# Patient Record
Sex: MALE | Race: WHITE | NOT HISPANIC OR LATINO | Employment: FULL TIME | URBAN - METROPOLITAN AREA
[De-identification: names, ages, dates, MRNs, and addresses within clinical notes are randomized per-mention and may not be internally consistent; named-entity substitution may affect disease eponyms.]

---

## 2020-04-20 ENCOUNTER — TELEPHONE (OUTPATIENT)
Dept: ORTHOPEDICS | Facility: CLINIC | Age: 60
End: 2020-04-20

## 2020-04-20 RX ORDER — DOLUTEGRAVIR SODIUM AND LAMIVUDINE 50; 300 MG/1; MG/1
1 TABLET, FILM COATED ORAL NIGHTLY
COMMUNITY
Start: 2020-04-06

## 2020-06-08 ENCOUNTER — TELEPHONE (OUTPATIENT)
Dept: ORTHOPEDICS | Facility: CLINIC | Age: 60
End: 2020-06-08

## 2020-06-08 NOTE — TELEPHONE ENCOUNTER
I spoke with he stated he already had his blood work done and he is good to go. Pt. Has no furthered questions.        ----- Message from Joanna Alexander sent at 6/8/2020 11:39 AM CDT -----  Contact: self [phone]  Patient is calling with some questions about what he spoke with you about earlier today. Please call

## 2020-06-08 NOTE — TELEPHONE ENCOUNTER
Spoke with patient, informed that orders for labs have been faxed to his PCP (Dr. Darvin Reed), once results received, his chart can be submitted for review.  Patient verbalized understanding.

## 2020-06-09 ENCOUNTER — TELEPHONE (OUTPATIENT)
Dept: ORTHOPEDICS | Facility: CLINIC | Age: 60
End: 2020-06-09

## 2020-06-09 NOTE — TELEPHONE ENCOUNTER
Spoke with patient, informed that Mya, from Dr. Darvin Reed office called and stated that only the CBC was drawn, informed patient that he needed to return to the office and have the CMP and PT drawn as well.  Patient stated that he would return to the office ASAP.

## 2020-06-09 NOTE — TELEPHONE ENCOUNTER
Spoke with Mya, she informed that she only kwame the CBC on the patient as he was in the office on yesterday. Writer informed that the patient would be contacted so that he could return for the additional labs.

## 2020-06-18 ENCOUNTER — TELEPHONE (OUTPATIENT)
Dept: ORTHOPEDICS | Facility: CLINIC | Age: 60
End: 2020-06-18

## 2020-06-18 NOTE — TELEPHONE ENCOUNTER
Spoke with patient, inquired if he sees a Nephrologist / Urologist due to this PCP notes indicating that he has a disorder of the kidney and ureter.  Patient stated that he know nothing about this diagnosis and was told that everything was fine at his last MD appointment.

## 2020-06-26 ENCOUNTER — TELEPHONE (OUTPATIENT)
Dept: ORTHOPEDICS | Facility: CLINIC | Age: 60
End: 2020-06-26

## 2020-06-26 NOTE — TELEPHONE ENCOUNTER
Spoke with patient, informed him of Dr. Bui concerns, he stated that he drinks  lots of water, he lifts weights every other day, he states that his physician asks him, every time he goes in, if he is staying hydrated.    Writer also informed that Dr. Reis requested medical clearance from his doctor, the request has been forwarded and currently waiting for a response.  Writer will contact him when a response is received from Dr. Reis.  Patient verbalized understanding.

## 2020-06-29 ENCOUNTER — TELEPHONE (OUTPATIENT)
Dept: ORTHOPEDICS | Facility: CLINIC | Age: 60
End: 2020-06-29

## 2020-06-29 NOTE — TELEPHONE ENCOUNTER
Spoke with patient, informed that Dr. Reed is out of town until July 9, 2020, therefore the letter of clearance will not arrive soon.  He stated that he has seen the NP in the office a couple of times and will call to see if she would feel comfortable writing the letter of clearance.  Writer informed that the reviewing physician was concerned re: elevated creatinine & BUN, as well as a mildly low saturation, questioning lung issues.  The patient stated that his labs may be elevated due to the workout supplements that he takes. He asked if he should stop taking them, writer referred him to his physician.  Patient verbalized understanding.

## 2020-06-29 NOTE — TELEPHONE ENCOUNTER
Spoke with Dr. Derek Gee office, she stated that Dr. Reed is on vacation until July 9, 2020.  Writer forwarded a requested for a letter of optimization / clearance last week.  The letter will be sent upon the physicians' return.

## 2020-07-06 ENCOUNTER — TELEPHONE (OUTPATIENT)
Dept: ORTHOPEDICS | Facility: CLINIC | Age: 60
End: 2020-07-06

## 2020-07-06 DIAGNOSIS — M17.11 PRIMARY OSTEOARTHRITIS OF RIGHT KNEE: Primary | ICD-10-CM

## 2020-07-06 NOTE — TELEPHONE ENCOUNTER
Spoke with patient, in regards to Dr. Reis's concern: patient was asked: Are you still taking workout supplements? He stated that the protein powder he uses 2-3 daily has 4.5 mg of creatinine  per serving, he will trade it out for another supplement.    Spoke with patient, informed of surgery date of 8-13-20, patient accepted and [place] [facility] [place] informed that COVID-19 testing will occur on 8-12-20 informed of items NOT included in the bundle: DME, Meds, Post op PT, link to the portal sent to cell phone, encouraged to review education on portal, Telemed visit set up for 7-27-20 @  1600, requested that the name and contact information for a physical therapy facility for post op therapy be provided, patient stated she would research and provide at a later time. PCP agreement received 4-28-20 and Dr. Reed is willing to f/u post-operatively.  Patient verbalized understanding.

## 2020-07-14 DIAGNOSIS — M17.11 PRIMARY OSTEOARTHRITIS OF RIGHT KNEE: Primary | ICD-10-CM

## 2020-07-14 DIAGNOSIS — Z01.818 PRE-OP TESTING: Primary | ICD-10-CM

## 2020-07-28 ENCOUNTER — TELEPHONE (OUTPATIENT)
Dept: ORTHOPEDICS | Facility: CLINIC | Age: 60
End: 2020-07-28

## 2020-07-28 NOTE — TELEPHONE ENCOUNTER
Spoke to pt, apologized for not being there for the telemed visit yesterday. The appt was not transferred to my calendar. Pt states that is fine, no problem. telemed visit rescheduled for 8/6 at 4p. Pt will provide OP PT location at the visit. Pt pleased and verbalized understanding.

## 2020-07-30 ENCOUNTER — OFFICE VISIT (OUTPATIENT)
Dept: INTERNAL MEDICINE | Facility: CLINIC | Age: 60
End: 2020-07-30
Payer: COMMERCIAL

## 2020-07-30 DIAGNOSIS — B20 HIV INFECTION, UNSPECIFIED SYMPTOM STATUS: ICD-10-CM

## 2020-07-30 DIAGNOSIS — Z97.2 WEARS DENTURES: ICD-10-CM

## 2020-07-30 DIAGNOSIS — M17.11 OSTEOARTHRITIS OF RIGHT KNEE, UNSPECIFIED OSTEOARTHRITIS TYPE: ICD-10-CM

## 2020-07-30 PROCEDURE — 99499 UNLISTED E&M SERVICE: CPT | Mod: 95,COE,, | Performed by: HOSPITALIST

## 2020-07-30 PROCEDURE — 99499 NO LOS: ICD-10-PCS | Mod: 95,COE,, | Performed by: HOSPITALIST

## 2020-07-30 RX ORDER — CHOLECALCIFEROL (VITAMIN D3) 25 MCG
3000 TABLET ORAL DAILY
COMMUNITY

## 2020-07-30 NOTE — PROGRESS NOTES
Virtual pre op evaluation   Audio only   [place]- [place]  Consent obtained for virtual evaluation   Had trouble with video    Each patient to whom he or she provides medical services by telemedicine is:  (1) informed of the relationship between the physician and patient and the respective role of any other health care provider with respect to management of the patient; and (2) notified that he or she may decline to receive medical services by telemedicine and may withdraw from such care at any time.        Future cases for Carlos Vee [[id number]]     Case ID Status Date Time Amos Procedure Provider Location    [id number] [facility] 8/13/2020  7:00  ARTHROPLASTY, KNEE / [facility] RACHEL Beatty MD [[id number]] [facility] OR      Rt     Spoke to him  on the Phone    [facility]   Works as a   - work involves walking , lifting   5 days a week ( 40 hours a week ) - 6 A- 3 P  Walks about 6 miles a day    Has occasional Rt knee pain- getting more frequent - few times a day  Feels like the Rt knee may give out and during that time feel severe pain- momentary pain- past 1 year     No previous Rt knee surgeries  About 8 years ago , he hyperextended his Rt  Knee- was exercising ( Stretching ) - Pain started 1-2  year later   No  infections  No fracture history     Stays active  Works out 3 times a week   Lifts weights   Unable to do cardio     Lives in a Duplex house  One level house   Nice neighborhood , very close to work  Lives with a room mate , who can help him    Health conditions of significance for the perioperative period     HIV    Not known to have heart disease , Diabetes Mellitus,HTN,  Lung disease          Medication and allergies reviewed        Active cardiac conditions- None -  RCRI-None -  Stays active   MET's-   > 4 METs     ROS     No fever , chill   Weight stable   STOPBANG-  age , Neck size, age  BMI 24.5  No sudden vision changes -  No  cough, phlegm-  Bowels regular -  No Overt GI/ bleeding -  No steroid treatment recently -  No Dysuria, Hesitancy-  No rashes -  No syncope , unilateral weakness -  Not on ASA  No easy bruising , bleeding   Depression, Anxiety- None    PMH     No DVT,PE, Stenting      Surgical history      No anesthesia , bleeding , cardiac trouble , PONV with previous surgeries, procedures      Social      As noted     FH    FH- No anesthesia,bleeding / venous thrombosis , early onset heart disease in family        Healthy person        Dr DILCIA Reis MD MRCP ( ),FACP   [facility]  [facility]   Cell [phone]

## 2020-07-30 NOTE — ASSESSMENT & PLAN NOTE
Diagnosed 2004  Acquired through sexual route - male   Follows safe sex  Under Infectious disease care   On Treatment since 2004   Last CD4  Count about 340 - about 3 months ago   Viral load undetectable   No other STD    Not known to have Tuberculosis , thrush     Not known to have Hep C     Had Pneumonia in 2012 when he was off HIV Medication

## 2020-07-30 NOTE — ASSESSMENT & PLAN NOTE
Has occasional Rt knee pain- getting more frequent - few times a day  Feels like the Rt knee may give out and during that time feel severe pain- momentary pain- past 1 year     No previous Rt knee surgeries  About 8 years ago , he hyperextended his Rt  Knee- was exercising ( Stretching ) - Pain started 1-2  year later   No  infections  No fracture history  Walks with a limp  Has limitation for squatting    Had a bone density resting in 2019 - As he had problems in having dental implants   Not known to have osteoporosis

## 2020-08-06 ENCOUNTER — TELEPHONE (OUTPATIENT)
Dept: INTERNAL MEDICINE | Facility: CLINIC | Age: 60
End: 2020-08-06

## 2020-08-06 ENCOUNTER — PATIENT OUTREACH (OUTPATIENT)
Dept: ORTHOPEDICS | Facility: CLINIC | Age: 60
End: 2020-08-06

## 2020-08-06 NOTE — TELEPHONE ENCOUNTER
The patient location is: [place]  The chief complaint leading to consultation is: R knee     Visit type: {TELE     Face to Face time with patient: 30  30 minutes of total time spent on the encounter, which includes face to face time and non-face to face time preparing to see the patient (eg, review of tests), Obtaining and/or reviewing separately obtained history, Documenting clinical information in the electronic or other health record, Independently interpreting results (not separately reported) and communicating results to the patient/family/caregiver, or Care coordination (not separately reported).         Each patient to whom he or she provides medical services by telemedicine is:  (1) informed of the relationship between the physician and patient and the respective role of any other health care provider with respect to management of the patient; and (2) notified that he or she may decline to receive medical services by telemedicine and may withdraw from such care at any time.    Notes: Joint education provided, pt may bring walker but has not decided. Commode will be delivered to home after surgery. Pt will fly caregiver is friend Alec. Pt will tomorrow with OP PT location. Answered questions and concerns. Pt pleased and verbalized understanding.

## 2020-08-06 NOTE — TELEPHONE ENCOUNTER
Called to follow up   Spoke to him   May 2019 - low sat     Stays active , works outs -No SOB, no exertional symptoms   No cough , phlegm   Not know to have Asthma, COPD  Quit tobacco 15-20 Years ago   Smoked on and off 15 years- hald PPD      Off creatinine supplements, B12 - 8/1   Creatinine 1.1 June 2020       He lad regular labs 8/4 -Requested to send me the records    Doing great   Only taking HIV Medication at this time

## 2020-08-07 ENCOUNTER — TELEPHONE (OUTPATIENT)
Dept: ORTHOPEDICS | Facility: CLINIC | Age: 60
End: 2020-08-07

## 2020-08-07 DIAGNOSIS — M17.11 PRIMARY OSTEOARTHRITIS OF RIGHT KNEE: Primary | ICD-10-CM

## 2020-08-07 NOTE — TELEPHONE ENCOUNTER
Spoke to pt, informed that Dr. Beatty will be out next week unexpectedly and will not be able to do his surgery, his colleague Dr. Alex is able to do the surgery on 8/12, P has been notified and will rearrange travel that needs to be changed. His appt will be rescheduled. Pt pleased and verbalized understanding.

## 2020-08-10 PROBLEM — Z01.89 LABORATORY TEST: Status: ACTIVE | Noted: 2020-08-10

## 2020-08-11 ENCOUNTER — HOSPITAL ENCOUNTER (OUTPATIENT)
Dept: RADIOLOGY | Facility: HOSPITAL | Age: 60
Discharge: HOME | End: 2020-08-11
Attending: ORTHOPAEDIC SURGERY
Payer: COMMERCIAL

## 2020-08-11 ENCOUNTER — OFFICE VISIT (OUTPATIENT)
Dept: ORTHOPEDICS | Facility: CLINIC | Age: 60
End: 2020-08-11
Payer: COMMERCIAL

## 2020-08-11 ENCOUNTER — LAB VISIT (OUTPATIENT)
Dept: SURGERY | Facility: CLINIC | Age: 60
End: 2020-08-11
Payer: COMMERCIAL

## 2020-08-11 ENCOUNTER — ANESTHESIA EVENT (OUTPATIENT)
Dept: SURGERY | Facility: HOSPITAL | Age: 60
DRG: 470 | End: 2020-08-11
Payer: COMMERCIAL

## 2020-08-11 ENCOUNTER — HOSPITAL ENCOUNTER (OUTPATIENT)
Dept: PREADMISSION TESTING | Facility: HOSPITAL | Age: 60
Discharge: HOME | End: 2020-08-11
Attending: ANESTHESIOLOGY
Payer: COMMERCIAL

## 2020-08-11 ENCOUNTER — INITIAL CONSULT (OUTPATIENT)
Dept: INTERNAL MEDICINE | Facility: CLINIC | Age: 60
End: 2020-08-11
Payer: COMMERCIAL

## 2020-08-11 VITALS
HEIGHT: 73 IN | BODY MASS INDEX: 24.73 KG/M2 | WEIGHT: 185 LBS | BODY MASS INDEX: 24.52 KG/M2 | HEIGHT: 72 IN | WEIGHT: 182.56 LBS

## 2020-08-11 VITALS
SYSTOLIC BLOOD PRESSURE: 106 MMHG | WEIGHT: 184.19 LBS | HEART RATE: 61 BPM | BODY MASS INDEX: 24.41 KG/M2 | DIASTOLIC BLOOD PRESSURE: 74 MMHG | OXYGEN SATURATION: 98 % | HEIGHT: 73 IN

## 2020-08-11 DIAGNOSIS — M85.80 OSTEOPENIA, UNSPECIFIED LOCATION: ICD-10-CM

## 2020-08-11 DIAGNOSIS — Z01.818 PRE-OP TESTING: ICD-10-CM

## 2020-08-11 DIAGNOSIS — Z97.2 WEARS DENTURES: ICD-10-CM

## 2020-08-11 DIAGNOSIS — M17.11 PRIMARY OSTEOARTHRITIS OF RIGHT KNEE: ICD-10-CM

## 2020-08-11 DIAGNOSIS — Z96.651 STATUS POST TOTAL RIGHT KNEE REPLACEMENT: ICD-10-CM

## 2020-08-11 DIAGNOSIS — M17.11 PRIMARY OSTEOARTHRITIS OF RIGHT KNEE: Primary | ICD-10-CM

## 2020-08-11 DIAGNOSIS — B20 HIV INFECTION, UNSPECIFIED SYMPTOM STATUS: ICD-10-CM

## 2020-08-11 DIAGNOSIS — M17.11 OSTEOARTHRITIS OF RIGHT KNEE, UNSPECIFIED OSTEOARTHRITIS TYPE: ICD-10-CM

## 2020-08-11 DIAGNOSIS — I83.93 ASYMPTOMATIC VARICOSE VEINS OF BOTH LOWER EXTREMITIES: ICD-10-CM

## 2020-08-11 DIAGNOSIS — Z01.89 LABORATORY TEST: ICD-10-CM

## 2020-08-11 DIAGNOSIS — R59.1 LYMPHADENOPATHY: ICD-10-CM

## 2020-08-11 LAB — SARS-COV-2 RDRP RESP QL NAA+PROBE: NEGATIVE

## 2020-08-11 PROCEDURE — 99999 PR PBB SHADOW E&M-EST. PATIENT-LVL III: CPT | Mod: PBBFAC,COE,, | Performed by: ORTHOPAEDIC SURGERY

## 2020-08-11 PROCEDURE — 99999 PR PBB SHADOW E&M-EST. PATIENT-LVL III: ICD-10-PCS | Mod: PBBFAC,COE,, | Performed by: HOSPITALIST

## 2020-08-11 PROCEDURE — 73560 XR KNEE 1 OR 2 VIEW RIGHT: ICD-10-PCS | Mod: 26,RT,COE, | Performed by: RADIOLOGY

## 2020-08-11 PROCEDURE — 99499 NO LOS: ICD-10-PCS | Mod: S$GLB,COE,, | Performed by: PHYSICIAN ASSISTANT

## 2020-08-11 PROCEDURE — 73560 X-RAY EXAM OF KNEE 1 OR 2: CPT | Mod: 26,RT,COE, | Performed by: RADIOLOGY

## 2020-08-11 PROCEDURE — 99203 OFFICE O/P NEW LOW 30 MIN: CPT | Mod: S$GLB,COE,, | Performed by: ORTHOPAEDIC SURGERY

## 2020-08-11 PROCEDURE — 99999 PR PBB SHADOW E&M-EST. PATIENT-LVL III: ICD-10-PCS | Mod: PBBFAC,COE,, | Performed by: PHYSICIAN ASSISTANT

## 2020-08-11 PROCEDURE — 99999 PR PBB SHADOW E&M-EST. PATIENT-LVL III: CPT | Mod: PBBFAC,COE,, | Performed by: HOSPITALIST

## 2020-08-11 PROCEDURE — U0002 COVID-19 LAB TEST NON-CDC: HCPCS

## 2020-08-11 PROCEDURE — 99999 PR PBB SHADOW E&M-EST. PATIENT-LVL III: ICD-10-PCS | Mod: PBBFAC,COE,, | Performed by: ORTHOPAEDIC SURGERY

## 2020-08-11 PROCEDURE — 99214 OFFICE O/P EST MOD 30 MIN: CPT | Mod: S$GLB,COE,, | Performed by: HOSPITALIST

## 2020-08-11 PROCEDURE — 99999 PR PBB SHADOW E&M-EST. PATIENT-LVL III: CPT | Mod: PBBFAC,COE,, | Performed by: PHYSICIAN ASSISTANT

## 2020-08-11 PROCEDURE — 99203 PR OFFICE/OUTPT VISIT, NEW, LEVL III, 30-44 MIN: ICD-10-PCS | Mod: S$GLB,COE,, | Performed by: ORTHOPAEDIC SURGERY

## 2020-08-11 PROCEDURE — 73560 X-RAY EXAM OF KNEE 1 OR 2: CPT | Mod: TC,RT

## 2020-08-11 PROCEDURE — 99214 PR OFFICE/OUTPT VISIT, EST, LEVL IV, 30-39 MIN: ICD-10-PCS | Mod: S$GLB,COE,, | Performed by: HOSPITALIST

## 2020-08-11 PROCEDURE — 99499 UNLISTED E&M SERVICE: CPT | Mod: S$GLB,COE,, | Performed by: PHYSICIAN ASSISTANT

## 2020-08-11 RX ORDER — PREGABALIN 25 MG/1
75 CAPSULE ORAL NIGHTLY
Status: CANCELLED | OUTPATIENT
Start: 2020-08-11

## 2020-08-11 RX ORDER — FAMOTIDINE 20 MG/1
20 TABLET, FILM COATED ORAL 2 TIMES DAILY
Status: CANCELLED | OUTPATIENT
Start: 2020-08-11

## 2020-08-11 RX ORDER — POLYETHYLENE GLYCOL 3350 17 G/17G
17 POWDER, FOR SOLUTION ORAL DAILY
Status: CANCELLED | OUTPATIENT
Start: 2020-08-11

## 2020-08-11 RX ORDER — MUPIROCIN 20 MG/G
1 OINTMENT TOPICAL 2 TIMES DAILY
Status: CANCELLED | OUTPATIENT
Start: 2020-08-11 | End: 2020-08-16

## 2020-08-11 RX ORDER — CELECOXIB 200 MG/1
200 CAPSULE ORAL DAILY
Qty: 30 CAPSULE | Refills: 0 | Status: SHIPPED | OUTPATIENT
Start: 2020-08-11 | End: 2020-09-10

## 2020-08-11 RX ORDER — AMOXICILLIN 250 MG
1 CAPSULE ORAL 2 TIMES DAILY
Status: CANCELLED | OUTPATIENT
Start: 2020-08-11

## 2020-08-11 RX ORDER — ONDANSETRON 2 MG/ML
4 INJECTION INTRAMUSCULAR; INTRAVENOUS EVERY 8 HOURS PRN
Status: CANCELLED | OUTPATIENT
Start: 2020-08-11

## 2020-08-11 RX ORDER — ROPIVACAINE HYDROCHLORIDE 2 MG/ML
8 INJECTION, SOLUTION EPIDURAL; INFILTRATION; PERINEURAL CONTINUOUS
Status: CANCELLED | OUTPATIENT
Start: 2020-08-11

## 2020-08-11 RX ORDER — CELECOXIB 100 MG/1
200 CAPSULE ORAL DAILY
Status: CANCELLED | OUTPATIENT
Start: 2020-08-11

## 2020-08-11 RX ORDER — NALOXONE HCL 0.4 MG/ML
0.02 VIAL (ML) INJECTION
Status: CANCELLED | OUTPATIENT
Start: 2020-08-11

## 2020-08-11 RX ORDER — PREGABALIN 25 MG/1
150 CAPSULE ORAL
Status: CANCELLED | OUTPATIENT
Start: 2020-08-11

## 2020-08-11 RX ORDER — OXYCODONE HYDROCHLORIDE 5 MG/1
10 TABLET ORAL
Status: CANCELLED | OUTPATIENT
Start: 2020-08-11

## 2020-08-11 RX ORDER — BISACODYL 10 MG
10 SUPPOSITORY, RECTAL RECTAL EVERY 12 HOURS PRN
Status: CANCELLED | OUTPATIENT
Start: 2020-08-11

## 2020-08-11 RX ORDER — DEXTROMETHORPHAN HYDROBROMIDE, GUAIFENESIN 5; 100 MG/5ML; MG/5ML
650 LIQUID ORAL EVERY 8 HOURS PRN
Qty: 120 TABLET | Refills: 0 | Status: SHIPPED | OUTPATIENT
Start: 2020-08-11

## 2020-08-11 RX ORDER — MUPIROCIN 20 MG/G
1 OINTMENT TOPICAL
Status: CANCELLED | OUTPATIENT
Start: 2020-08-11

## 2020-08-11 RX ORDER — ASPIRIN 81 MG/1
81 TABLET ORAL 2 TIMES DAILY
Status: CANCELLED | OUTPATIENT
Start: 2020-08-11

## 2020-08-11 RX ORDER — SODIUM CHLORIDE 9 MG/ML
INJECTION, SOLUTION INTRAVENOUS
Status: CANCELLED | OUTPATIENT
Start: 2020-08-11

## 2020-08-11 RX ORDER — OXYCODONE HYDROCHLORIDE 5 MG/1
TABLET ORAL
Qty: 50 TABLET | Refills: 0 | Status: SHIPPED | OUTPATIENT
Start: 2020-08-11 | End: 2020-08-18 | Stop reason: SDUPTHER

## 2020-08-11 RX ORDER — MIDAZOLAM HYDROCHLORIDE 1 MG/ML
1 INJECTION INTRAMUSCULAR; INTRAVENOUS EVERY 5 MIN PRN
Status: CANCELLED | OUTPATIENT
Start: 2020-08-11

## 2020-08-11 RX ORDER — FENTANYL CITRATE 50 UG/ML
25 INJECTION, SOLUTION INTRAMUSCULAR; INTRAVENOUS EVERY 5 MIN PRN
Status: CANCELLED | OUTPATIENT
Start: 2020-08-11

## 2020-08-11 RX ORDER — SODIUM CHLORIDE 9 MG/ML
INJECTION, SOLUTION INTRAVENOUS CONTINUOUS
Status: CANCELLED | OUTPATIENT
Start: 2020-08-11 | End: 2020-08-12

## 2020-08-11 RX ORDER — ASPIRIN 81 MG/1
81 TABLET ORAL 2 TIMES DAILY
Qty: 60 TABLET | Refills: 0 | Status: SHIPPED | OUTPATIENT
Start: 2020-08-11 | End: 2020-09-10

## 2020-08-11 RX ORDER — DOCUSATE SODIUM 100 MG/1
100 CAPSULE, LIQUID FILLED ORAL 2 TIMES DAILY PRN
Qty: 60 CAPSULE | Refills: 0 | Status: SHIPPED | OUTPATIENT
Start: 2020-08-11

## 2020-08-11 RX ORDER — MORPHINE SULFATE 10 MG/ML
2 INJECTION, SOLUTION INTRAMUSCULAR; INTRAVENOUS
Status: CANCELLED | OUTPATIENT
Start: 2020-08-11

## 2020-08-11 RX ORDER — ACETAMINOPHEN 500 MG
1000 TABLET ORAL
Status: CANCELLED | OUTPATIENT
Start: 2020-08-11

## 2020-08-11 RX ORDER — OXYCODONE HYDROCHLORIDE 5 MG/1
5 TABLET ORAL
Status: CANCELLED | OUTPATIENT
Start: 2020-08-11

## 2020-08-11 RX ORDER — CELECOXIB 100 MG/1
400 CAPSULE ORAL
Status: CANCELLED | OUTPATIENT
Start: 2020-08-11

## 2020-08-11 RX ORDER — ACETAMINOPHEN 500 MG
1000 TABLET ORAL EVERY 6 HOURS
Status: CANCELLED | OUTPATIENT
Start: 2020-08-11 | End: 2020-08-13

## 2020-08-11 RX ORDER — SODIUM CHLORIDE 0.9 % (FLUSH) 0.9 %
10 SYRINGE (ML) INJECTION
Status: CANCELLED | OUTPATIENT
Start: 2020-08-11

## 2020-08-11 RX ORDER — TALC
6 POWDER (GRAM) TOPICAL NIGHTLY PRN
Status: CANCELLED | OUTPATIENT
Start: 2020-08-11

## 2020-08-11 RX ORDER — LIDOCAINE HYDROCHLORIDE 10 MG/ML
1 INJECTION, SOLUTION EPIDURAL; INFILTRATION; INTRACAUDAL; PERINEURAL
Status: CANCELLED | OUTPATIENT
Start: 2020-08-11

## 2020-08-11 NOTE — DISCHARGE INSTRUCTIONS
Your surgery has been scheduled for:__________________________________________    You should report to:  ____[facility], located on the [place] side of the first floor of the           [facility] ([phone])  ____[facility], located on the [facility] side of the            Second floor of the [facility] ([phone])  ____[facility] ([facility]) [facility]ated [facility] [address]  Please Note   - Tell your doctor if you take Aspirin, products containing Aspirin, herbal medications  or blood thinners, such as Coumadin, Ticlid, or Plavix.  (Consult your provider regarding holding or stopping before surgery).  - Arrange for someone to drive you home following surgery.  You will not be allowed to leave the surgical facility alone or drive yourself home following sedation and anesthesia.  Before Surgery  - Stop taking all herbal medications 14days prior to surgery  - No Motrin/Advil (Ibuprofen) 7 days before surgery  - No Aleve (Naproxen) 7 days before surgery  - Stop Taking Aspirin, products containing Aspirin _____days before surgery  - Stop taking blood thinners_______days before surgery  - No Goody's/BC  Powder 7 days before surgery  - Refrain from drinking alcoholic beverages for 24hours before and after surgery  - Stop or limit smoking _________days before surgery  - You may take Tylenol for pain  Night before Surgery   Stop ALL solid food, gum, candy (including vitamins) 8 hours before arrival time.  (Please note: If your surgeon gives you different eating and drinking instructions, please follow surgeon's directions.)   Stop all CLOUDY liquids: coffee with creamer, formula, tube feeds, cloudy juices, non-human milk and breast milk with additives, 6 hours prior to arrival time.   Stop plain breast milk 4 hours prior to arrival time.   The patient should be ENCOURAGED to drink carbohydrate-rich clear liquids (sports  drinks, clear juices) until 2 hours prior to arrival time.   CLEAR liquids include only water, black coffee NO creamer, clear oral rehydration drinks, clear sports drinks or clear fruit juices (no orange juice, no pulpy juices, no apple cider). Advise patients if they can read newsprint through the liquid, it qualifies as clear liquid.    IF IN DOUBT, drink water instead.   - Take a shower or bath (shower is recommended).  Bathe with Hibiclens soap or an antibacterial soap from the neck down.  If not supplied by your surgeon, hibiclens soap will need to be purchased over the counter in pharmacy.  Rinse soap off thoroughly.  - Shampoo your hair with your regular shampoo  The Day of Surgery  · NOTHING TO  DRINK 2 hours before arrival time. If you are told to take medication on the morning of surgery, it may be taken with a sip of water.   - Take another bath or shower with hibiclens or any antibacterial soap, to reduce the chance of infection.  - Take heart and blood pressure medications with a small sip of water, as advised by the perioperative team.  - Do not take fluid pills  - You may brush your teeth and rinse your mouth, but do not swallow any additional water.   - Do not apply perfumes, powder, body lotions or deodorant on the day of surgery.  - Nail polish should be removed.  - Do not wear makeup or moisturizer  - Wear comfortable clothes, such as a button front shirt and loose fitting pants.  - Leave all jewelry, including body piercings, and valuables at home.    - Bring any devices you will neeed after surgery such as crutches or canes.  - If you have sleep apnea, please bring your CPAP machine  In the event that your physical condition changes including the onset of a cold or respiratory illness, or if you have to delay or cancel your surgery, please notify your surgeon.  Anesthesia: Regional Anesthesia    Youre scheduled for surgery. During surgery, youll receive medicine called anesthesia to keep you  comfortable and pain-free. Your surgeon has decided that youll receive regional anesthesia. This sheet tells you what to expect with this type of anesthesia.  What is regional anesthesia?  Regional anesthesia numbs one region of your body. The anesthesia may be given around nerves or into veins in your arms, neck, or legs (nerve block or Greg block). Or it may be sent into the spinal fluid (spinal anesthesia) or into the space just outside the spinal fluid (epidural anesthesia). You may also be given sedatives to help you relax.  Nerve block or Greg block  A small area of the body, such as an arm or leg, can be numbed using a nerve block or [place] block.  · Nerve block. During a nerve block, your skin is numbed. A needle is then inserted near nerves that serve the area to be numbed. Anesthetic is sent through the needle.  · IV regional or Greg block. For this type of block, an IV line is put into a vein. The blood flow to the area to be numbed is blocked for a short time. Anesthetic is sent through the IV.  Spinal anesthesia  Spinal anesthesia numbs your body from about the waist down.  · Anesthetic is injected into the spinal fluid. This is a substance that surrounds the spinal cord in your spinal column. The anesthetic blocks pain traveling from the body to the brain.  · To receive the anesthetic, your skin is numbed at the injection site on your back.  · A needle is then inserted into the spinal space. Anesthetic is sent into the spinal fluid through the needle.  Epidural anesthesia  Epidural anesthesia is most commonly used during childbirth and may also be used after surgical procedures of the chest, belly, and legs.  · Anesthetic is injected into the epidural space. This is just outside the dural sac which contains the spinal fluid.  · To receive the anesthetic, your skin is numbed at the injection site on your back.  · A needle is then inserted into the epidural space. Anesthetic is sent into the epidural  space through the needle.  · A small flexible catheter may be attached to the needle and left in place. This allows for continuous injections or infusions of anesthetic.  Anesthesia tools and medicines that might be near you during your procedure  · Local anesthetic. This medicine is given through a needle numbs one region of your body.  · Electrocardiography leads (electrodes). These are used to record your heart rate and rhythm.  · Blood pressure cuff. A cuff is placed on your arm to keep track of your blood pressure.  · Pulse oximeter. This small clip is placed on the end of the finger. It measures your blood oxygen level.  · Sedatives. These medicines may be given through an IV. They help to relax you and keep you comfortable. You may stay awake or sleep lightly.  · Oxygen. You may be given oxygen through a facemask.  Risks and possible complications  Regional anesthesia carries some risks. These include:  · Nausea and vomiting  · Headache  · Backache  · Decreased blood pressure  · Allergic reaction to the anesthetic  · Ongoing numbness (rare)  · Irregular heartbeat (rare)  · Cardiac arrest (rare)   Date Last Reviewed: 12/1/2016  © 3106-2007 [employer]. [address] [place] [address]. All rights reserved. This information is not intended as a substitute for professional medical care. Always follow your healthcare professional's instructions.

## 2020-08-11 NOTE — PROGRESS NOTES
"  Subjective:     HPI:   Carlos Vee is a 60 y.o. male who presents as a new [facility] [place] for right knee arthritis and total knee replacement    He says he had a injury in 2010 some type of either valgus hyperextension but sounds like more like a patellar dislocation.  He was in a wheelchair for several months and then used some crutches after that.  He has intermittent sharp severe pain in the anterior knee activity related and relieved with rest.  No groin anterior thigh radicular pain or paresthesias.  At the time of injury sounds like he had almost like a complex regional pain issue he had sharp medial pain down his lower leg into his foot at this point he still has some neuropathy in his lower foot.  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  He denies any history of infection in the knee.     Since then he has gone on to have years of global predominantly anterior knee pain moderate to severe nature activity related and relieved with rest.    He has tried ibuprofen in the past.  No history of injections.  He did physical therapy 3 years ago.  He has tried an Ace wrap he was using crutches up until about 3 years ago and then he did therapy was able to get off of them.  He can walk about a 0.5 mile at a time he says he walks up to 6 miles a day at [employer] at work but this is short distances at a time.  Limitations include squatting bending extended the knee and the knee starting to hurt his back.    He lives in [place], [place], is a .  Was a  at the [employer].  He has a friend who is going to help him out after surgery who is his "spiritual mom"       ROS:  The updated medical history is in the chart and has been reviewed. A review of systems is updated and there is no reported vision changes, ear/nose/mouth/throat complaints,  chest pain, shortness of breath, abdominal " pain, urological complaints, fevers or chills, psychiatric complaints. Musculoskeletal and neurologcial symptoms are as documented. All other systems are negative.      Objective:   Exam:  There were no vitals filed for this visit.  Body mass index is 24.76 kg/m².    Physical examination included assessment of the patient's general appearance with particular attention to development, nutrition, body habitus, attention to grooming, and any evidence of distress.  Constitutional: The patient is a well-developed, well-nourished patient in no acute distress.   Cardiovascular: Vascular examination included warmth and capillary refill as well inspection for edema and assessment of pedal pulses. Pulses are palpable and regular.  Musculoskeletal: Gait was assessed as to whether it was steady, non-antalgic, and/or required the use of an assist device. The patient was also asked to walk independently and get onto the examination table.  Skin: The skin was examined for any obvious rashes or lesions in the extremity.  Neurologic: Sensation is intact to light touch in the extremity. The patient has good coordination without hyperreflexia and is alert and oriented to person, place and time and has normal mood and affect.     All of the above were examined and found to be within normal limits except for the following pertinent clinical findings:    Antalgic gait with a limp favoring the right knee walks with knee flexed.   degree knee range of motion 7° valgus alignment knee stable anterior-posterior varus and valgus stresses with significant flexion contracture but no extensor lag he is tender palpation on the patellofemoral joint and lateral joint lines a mild effusion.  Skin is intact to the anterior knee.  He does have several superficial cat scratches around the thigh, he has a healed wound at the anterior proximal 3rd shin he says this happened 6 months ago no history of infection.       Imaging:  Indication:  Right  knee pain  Exam Ordered: Radiographs of the right knee include a standing anteroposterior view, a standing posterioanterior view, a lateral view in full flexion, and a sunrise view  Details of Examination: exam shows evidence of joint space narrowing, osteophyte formation, and subchondral sclerosis, all consistent with degenerative arthritis of the knee.  No other significant findings are noted.  Impression:  Degenerative Arthritis, Right Knee  Severe grade 4 especially patellofemoral joint      Assessment:     Primary osteoarthritis of right knee [M17.11]  Valgus knee with tricompartmental and especially severe patellofemoral joint arthritis    HIV, currently well controlled  PTSD     Plan:       This patient has significant symptoms in their knee that are affecting their quality of life and daily activities.  They have tried non-operative treatment including analgesics, an exercise program, and activity modification, but the symptoms have persisted. I believe they make a good candidate for knee arthroplasty.     The risks and benefits of knee arthroplasty have been discussed with the patient which include, but are not limited to infections (including severe sequelae), potential component failure, fracture, DVT, pulmonary embolus, nerve palsy, poor range of motion, the possibility of bone grafting, persistent pain, wound healing complications, transfusions, medical complications and death.     Pre-operative planning will include the following:  A pre-surgical evaluation by will be arranged.  Pre-op orders will be placed.  We will make arrangements with the operating room for proper time and staffing.  We will make Social Service arrangements for the patient.    Implants:   Company: [employer]  System: Sigma    DVT prophylaxis: ASA 81mg BID x1 month  Dispo: outpatient PT    [facility]mission [place]: [facility]      Location: [place]                                    We will plan on a right total knee replacement tomorrow August 12th.   He will bring his HIV medications with him.  We discussed importance of avoiding any cat scratches postoperatively to help prevent infection      No orders of the defined types were placed in this encounter.            Past Medical History:   Diagnosis Date    Chronic pain     Disorder of kidney and ureter     Erectile dysfunction due to diseases classified elsewhere     HIV (human immunodeficiency virus infection)     Pneumonia 2012    Primary localized osteoarthrosis of the knee, right        Past Surgical History:   Procedure Laterality Date    HERNIA REPAIR      Left groin       Family History   Problem Relation Age of Onset    Lung cancer Mother         mother had smoking history    Hypertension Father     Hypertension Brother        Social History     Socioeconomic History    Marital status: Single     Spouse name: Not on file    Number of children: Not on file    Years of education: Not on file    Highest education level: Not on file   Occupational History    Occupation:      Employer: [employer]   Social Needs    Financial resource strain: Not on file    Food insecurity     Worry: Not on file     Inability: Not on file    Transportation needs     Medical: Not on file     Non-medical: Not on file   Tobacco Use    Smoking status: Former Smoker     Packs/day: 0.50     Years: 35.00     Pack years: 17.50     Types: Cigarettes     Quit date:      Years since quittin.6    Smokeless tobacco: Never Used   Substance and Sexual Activity    Alcohol use: Not Currently    Drug use: Not Currently     Types: Methamphetamines     Comment: Crystal Meth - quit     Sexual activity: Not on file   Lifestyle    Physical activity     Days per week: Not on file     Minutes per session: Not on file    Stress: Not on file   Relationships    Social connections     Talks on phone: Not on file     Gets together: Not on file     Attends Scientologist service: Not on file     Active  member of club or organization: Not on file     Attends meetings of clubs or organizations: Not on file     Relationship status: Not on file   Other Topics Concern    Not on file   Social History Narrative    Not on file

## 2020-08-11 NOTE — HPI
History of present illness- I had the pleasure of meeting this pleasant 60 y.o. gentleman in the pre op clinic prior to his elective Orthopedic surgery. The patient is known  to me .     I have obtained the history by speaking to the patient and by reviewing the electronic health records.    Events leading up to surgery / History of presenting illness -    Under [facility] program   Works as a   - work involves walking , lifting   5 days a week ( 40 hours a week ) - 6 A- 3 P  Walks about 6 miles a day     Has occasional Rt knee pain- getting more frequent - few times a day  Feels like the Rt knee may give out and during that time feel severe pain- momentary pain- past 1 year      No previous Rt knee surgeries  About 8 years ago , he hyperextended his Rt  Knee- was exercising ( Stretching ) - Pain started 1-2  year later   No  infections  No fracture history        Relevant health conditions of significance for the perioperative period/ History of presenting illness -    Stays active  Works out 3 times a week   Lifts weights   Unable to do cardio      Lives in a Duplex house  One level house   Nice neighborhood , very close to work  Lives with a room mate , who can help him     Health conditions of significance for the perioperative period      HIV     Not known to have heart disease , Diabetes Mellitus,HTN,  Lung disease

## 2020-08-11 NOTE — ASSESSMENT & PLAN NOTE
June 2020-     INR - 1.0  Hb, HCT,PLT-N  Creatinine 1.11 ( was 1.2 from Feb 2020 )  AST,ALT , Albumin- N    Feb 2020     TSH- N    May 2019     Sinus bradycardia  Otherwise normal EKG    Creatinine 1.05 May 2019       Was having protein powder with creatinine until about 3 weeks ago   Stopped  Herbal supplements about 10 days a go    Check BMP today

## 2020-08-11 NOTE — PROGRESS NOTES
[facility] - Pre Op Consult  Progress Note    Patient Name: Carlos Vee  MRN: [id number]  Date of Evaluation- 08/11/2020  PCP- Darvin Reed (Inactive)    Future cases for Carlos Vee [[id number]]     Case ID Status Date Time Amos Procedure Provider Location    [id number] [facility] 8/12/2020 10:00  ARTHROPLASTY, KNEE / [employer] RACHEL Bhavik Alex III, MD [[id number]] [facility]          HPI:  History of present illness- I had the pleasure of meeting this pleasant 60 y.o. gentleman in the pre op clinic prior to his elective Orthopedic surgery. The patient is known  to me .     I have obtained the history by speaking to the patient and by reviewing the electronic health records.    Events leading up to surgery / History of presenting illness -    Under [facility]   Works as a   - work involves walking , lifting   5 days a week ( 40 hours a week ) - 6 A- 3 P  Walks about 6 miles a day     Has occasional Rt knee pain- getting more frequent - few times a day  Feels like the Rt knee may give out and during that time feel severe pain- momentary pain- past 1 year      No previous Rt knee surgeries  About 8 years ago , he hyperextended his Rt  Knee- was exercising ( Stretching ) - Pain started 1-2  year later   No  infections  No fracture history        Relevant health conditions of significance for the perioperative period/ History of presenting illness -    Stays active  Works out 3 times a week   Lifts weights   Unable to do cardio      Lives in a Duplex house  One level house   Nice neighborhood , very close to work  Lives with a room mate , who can help him     Health conditions of significance for the perioperative period      HIV     Not known to have heart disease , Diabetes Mellitus,HTN,  Lung disease                  Subjective/ Objective:       Chief complaint-Preoperative evaluation, Perioperative Medical management, complication reduction plan     Active  "cardiac conditions- none    Revised cardiac risk index predictors- none    Functional capacity -Examples of physical activity , walks a lot at work- Works out 3 times a week ,Lifts weights  can take 1 flight of stairs----- He can undertake all the above activities without  chest pain,chest tightness, Shortness of breath ,dizziness,lightheadedness making his exercise tolerance more,   than 4 Mets.       Review of Systems   Constitutional: Negative for chills and fever.        No unusual weight changes     HENT:        STOPBANG score 3 / 8        Age over 50 years  Neck size over 40 CM  Male gender   Eyes:        No unusual vision changes   Respiratory:        No cough , phlegm    No Hemoptysis   Cardiovascular:        As noted   Gastrointestinal:        Bowels- Regular  No overt GI/ blood losses  Suggested follow up regarding Colonoscopy    Endocrine:        Prednisone use > 20 mg daily for 3 weeks- none   Genitourinary: Negative for dysuria.        No urinary hesitancy    Musculoskeletal:        As above      Skin: Negative for rash.   Neurological: Negative for syncope.        No unilateral weakness   Hematological:        Current use of Anticoagulants  None   Psychiatric/Behavioral:        No Depression,Anxiety     no testicular lumps   No vascular stenting     No past medical history pertinent negatives.        No anesthesia, bleeding, cardiac problems , PONVwith previous surgeries/procedures.  Medications and Allergies reviewed in epic.   FH- No anesthesia,bleeding / venous thrombosis ,problems in family     Physical Exam  Blood pressure 106/74, pulse 61, height 6' 1" (1.854 m), weight 83.6 kg (184 lb 3.2 oz), SpO2 98 %.      Physical Exam  Constitutional- Vitals - Body mass index is 24.3 kg/m².,   Vitals:    08/11/20 1015   BP: 106/74   Pulse: 61     General appearance-Conscious,Coherent  Eyes- No conjunctival icterus,pupils  round  and reactive to light   ENT-Oral cavity- moist ,  upper denture and lower " denture  , Hearing grossly normal   Neck- No thyromegaly ,Trachea -central, No jugular venous distension,   No Carotid Bruit   Cardiovascular -Heart Sounds- Normal  and  no murmur   , No gallop rhythm   Respiratory - Normal Respiratory Effort, Normal breath sounds,  no wheeze  and  no forced expiratory wheeze    Peripheral pitting pedal edema-- none ,  varicose veins right lower extremity  and  varicose veins left lower extremity, no calf pain   Gastrointestinal -Soft abdomen, No palpable masses, Non Tender,Liver,Spleen not palpable. No-- free fluid and shifting dullness  Musculoskeletal- No finger Clubbing. Strength grossly normal   Lymphatic-No Palpable cervical, axillary, lymphadenopathy   Psychiatric - normal effect,Orientation  Rt Dorsalis pedis pulses-palpable    Lt Dorsalis pedis pulses- palpable   Rt Posterior tibial pulses -palpable   Left posterior tibial pulses -palpable   Miscellaneous -  Surgical scarLeft groin ,  no renal bruit and  bowel sounds positive   Investigations  Lab and Imaging have been reviewed in epic.    Review of Medicine tests    EKG- --May 2019   It was reported to be showing     Sinus bradycardia  Otherwise normal ECG                Review of old records- Was done and information gathered regards to events leading to surgery and health conditions of significance in the perioperative period.        Preoperative cardiac risk assessment-  The patient does not have any active cardiac conditions . Revised cardiac risk index predictors-0 ---.Functional capacity is more than 4 Mets. He will be undergoing a Orthopedic procedure that carries a Moderate Risk risk   Risk of a major Cardiac event ( Defined as death, myocardial infarction, or cardiac arrest at 30 days after noncardiac surgery), based on RCRI score     -3.9%       No further cardiac work up is indicated prior to proceeding with the surgery     Orders Placed This Encounter    Basic metabolic panel       American Society of  Anesthesiologists Physical status classification ( ASA ) class: 2     Postoperative pulmonary complication risk assessment:      ARISCAT ( [place]) risk index- risk class -  Low, if duration of surgery is under 3 hours, intermediate, if duration of surgery is over 3 hours          Assessment/Plan:     Wears dentures  Top and bottom   Been wearing dentures for about 15 years - since about 2005     HIV (human immunodeficiency virus infection)  Diagnosed 2004  Acquired through sexual route - male   Follows safe sex  Under Infectious disease care   On Treatment since 2004   Last CD4  Count about 340 - about 3 months ago   Viral load undetectable   No other STD     Not known to have Tuberculosis , thrush     Chart review    CD4 count Feb 202- 340       Not known to have Hep C      Had Pneumonia in 2012 when he was off HIV Medication - has been on it since   No aspiration, no chocking , no acid reflux     Osteoarthritis of right knee  Has occasional Rt knee pain- getting more frequent - few times a day  Feels like the Rt knee may give out and during that time feel severe pain- momentary pain- past 1 year      No previous Rt knee surgeries  About 8 years ago , he hyperextended his Rt  Knee- was exercising ( Stretching ) - Pain started 1-2  year later   No  infections  No fracture history  Walks with a limp  Has limitation for squatting     Had a bone density resting in 2019 - As he had problems in having dental implants   Not known to have osteoporosis     Laboratory test  June 2020-     INR - 1.0  Hb, HCT,PLT-N  Creatinine 1.11 ( was 1.2 from Feb 2020 )  AST,ALT , Albumin- N    Feb 2020     TSH- N    May 2019     Sinus bradycardia  Otherwise normal EKG    Creatinine 1.05 May 2019       Was having protein powder with creatinine until about 3 weeks ago   Stopped  Herbal supplements about 10 days a go    Check BMP today     Osteopenia  2019   Low bone density/osteopenia is present in the right proximal femur  lying just below  the average patient's age-matched control consistent  with age-appropriate physiologic versus early accelerated bone  Demineralization.    No fracture history     On Vitamin D    Asymptomatic varicose veins of both lower extremities  Work involves standing   Increased risk of thrombosis in the wallace operative period , compression stocking use discussed    Lymphadenopathy  Small , Rt groin   Showed it to him   Suggested follow up   No abnormal testicular lumps         Preventive perioperative care    Thromboembolic prophylaxis:  His risk factors for thrombosis include surgical procedure and age.I suggest  thromboembolic prophylaxis ( mechanical/pharmacological, weighing the risk benefits of pharmacological agent use considering wallace procedural bleeding )  during the perioperative period.I suggested being active in the post operative period.   The patient is a candidate for extended DVT prophylaxis     Postoperative pulmonary complication prophylaxis-Risk factors for post operative pulmonary complications include- I suggest incentive spirometry use, early ambulation and end tidal carbon dioxide monitoring  , oral care , head end of bed elevation      Renal complication prophylaxis- I suggest keeping him well hydrated  in the perioperative period.     Surgical site Infection Prophylaxis-I  suggest appropriate antibiotic for Prophylaxis against Surgical site infections  No reported Staph infection         In view of regional anesthesia  the patient  is at risk of postoperative urinary retention.  I suggest avoidance / minimizing the of  Benzodiazepines,Anticholinergic medication,antihistamines ( Benadryl) , if possible in the perioperative period. I suggest using the minimum possible use of opioids for the minimum period of time in the perioperative period. Benadryl avoidance suggested      This visit was focused on Preoperative evaluation, Perioperative Medical management, complication reduction plans. I suggest that  the patient follows up with primary care or relevant sub specialists for ongoing health care.    I appreciate the opportunity to be involved in this patients care. Please feel free to contact me if there were any questions about this consultation.    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

## 2020-08-11 NOTE — H&P
CC: Right knee pain    Carlos Vee is a 60 y.o. male with history of Right knee pain. Pain is worse with activity and weight bearing.  Patient has experienced interference of activities of daily living due to decreased range of motion and an increase in joint pain and swelling.  Patient has failed non-operative treatment including NSAIDs, physical therapy, and activity modification.  Carlos Vee currently ambulates using assistive device .     Relevant medical conditions of significance in perioperative period:  HIV: on meds, well controlled   PTSD  Tobacco abuse: quit in 2002    Past Medical History:   Diagnosis Date    Chronic pain     Disorder of kidney and ureter     Erectile dysfunction due to diseases classified elsewhere     HIV (human immunodeficiency virus infection)     Pneumonia 09/2012    Primary localized osteoarthrosis of the knee, right        Past Surgical History:   Procedure Laterality Date    HERNIA REPAIR  2003    Left groin       Family History   Problem Relation Age of Onset    Lung cancer Mother         mother had smoking history    Hypertension Father     Prostate cancer Father     Hypertension Brother     Prostate cancer Brother        Review of patient's allergies indicates:  No Known Allergies      Current Outpatient Medications:     alprostadiL 500 mcg/mL Soln 50 mcg, phentolamine 5 mg SolR 5 mg, papaverine 30 mg/mL Soln 30 mcg, by Intracavernosal route., Disp: , Rfl:     cyanocobalamin, vitamin B-12, (VITAMIN B-12 ORAL), Take 1 tablet by mouth once daily. Hold for 1 week prior to surgery, Disp: , Rfl:     docosahexaenoic acid/epa (FISH OIL ORAL), Take by mouth 2 (two) times daily. Hold for 1 week prior to surgery, Disp: , Rfl:     DOVATO  mg Tab, Take 1 tablet by mouth every evening. Takes at night, Disp: , Rfl:     vitamin D (VITAMIN D3) 1000 units Tab, Take 3,000 Units by mouth once daily. 3 capsules daily  Hold for 1 week prior to surgery, Disp: ,  "Rfl:     acetaminophen (TYLENOL) 650 MG TbSR, Take 1 tablet (650 mg total) by mouth every 8 (eight) hours as needed. (Patient not taking: Reported on 8/11/2020), Disp: 120 tablet, Rfl: 0    aspirin (ECOTRIN) 81 MG EC tablet, Take 1 tablet (81 mg total) by mouth 2 (two) times daily. (Patient taking differently: Take 81 mg by mouth 2 (two) times daily. POSTOP RX), Disp: 60 tablet, Rfl: 0    celecoxib (CELEBREX) 200 MG capsule, Take 1 capsule (200 mg total) by mouth once daily. (Patient taking differently: Take 200 mg by mouth once daily. POSTOP RX), Disp: 30 capsule, Rfl: 0    docusate sodium (COLACE) 100 MG capsule, Take 1 capsule (100 mg total) by mouth 2 (two) times daily as needed for Constipation. (Patient taking differently: Take 100 mg by mouth 2 (two) times daily as needed for Constipation. POSTOP RX), Disp: 60 capsule, Rfl: 0    oxyCODONE (ROXICODONE) 5 MG immediate release tablet, Take 1-2 tabs by mouth every 4-6 hours as needed for pain (Patient taking differently: Take 1-2 tabs by mouth every 4-6 hours as needed for pain POSTOP RX), Disp: 50 tablet, Rfl: 0    UNABLE TO FIND, Take 1 capsule by mouth once daily. medication name: nicocinomide riboside chloride  Hold for 1 week prior to surgery, Disp: , Rfl:     Review of Systems:  Constitutional: no fever or chills  Eyes: no visual changes  ENT: no nasal congestion or sore throat  Respiratory: no cough or shortness of breath  Cardiovascular: no chest pain or palpitations  Gastrointestinal: no nausea or vomiting, tolerating diet  Genitourinary: no hematuria or dysuria  Integument/Breast: no rash or pruritis  Hematologic/Lymphatic: no easy bruising or lymphadenopathy  Musculoskeletal: positive for knee pain  Neurological: no seizures or tremors  Behavioral/Psych: no auditory or visual hallucinations  Endocrine: no heat or cold intolerance    PE:  Ht 6' 1" (1.854 m)   Wt 83.9 kg (185 lb)   BMI 24.41 kg/m²   General: Pleasant, cooperative, NAD   Gait: " antalgic  HEENT: NCAT, sclera nonicteric   Lungs: Respirations clear bilaterally; equal and unlabored.   CV: S1S2; 2+ bilateral upper and lower extremity pulses.   Skin: Intact throughout with no rashes, erythema, or lesions  Extremities: No LE edema,  no erythema or warmth of the skin in either lower extremity.    Right knee exam:  Knee Range of Motion: active, pain with passive range of motion  Effusion:none  Condition of skin:intact and + cat scratches   Location of tenderness:Lateral joint line   Strength:5 of 5 quadriceps strength and 5 of 5 hamstring strength  Stability: stable to testing    Hip Examination: painless PROM of hip     Radiographs: Radiographs reveal advanced degenerative changes including subchondral cyst formation, subchondral sclerosis, osteophyte formation, joint space narrowing.     Knee Alignment:  Significiant valgus    Diagnosis: osteoarthritis Right knee    Plan: Right total knee arthroplasty    Due to the serious nature of total joint infection and the high prevalence of community acquired MRSA, vancomycin will be used perioperatively.

## 2020-08-11 NOTE — PROGRESS NOTES
Carlos Vee is a 60 y.o. year old here today for a pre-operative visit in preparation for a Right total knee arthroplasty to be performed by Dr. Alex  on 8/12/2020.  he was last seen and treated in the clinic on 8/11/2020. he will be medically optimized by the pre op center. There has been no significant change in medical status since last visit. No fever, chills, malaise, or unexplained weight change.      Allergies, Medications, past medical and surgical history reviewed.    Focused examination performed.    Patient saw surgeon in clinic today. All questions answered. Patient encouraged to call with questions. Contact information given.     Pre, wallace, and post operative procedures and expectations discussed. Questions were answered. Carlos Vee has been educated and is ready to proceed with surgery. Approximately 30 minutes was spent discussing surgical outcomes, plans, procedures pre, wallace, and post operative expections and care.  Surgical consent signed.    Carlos Vee will contact us if there are any questions, concerns, or changes in medical status prior to surgery.       Joint class done during ortho clinic visit    COVID-19 test date: today     patient will be scheduled with [facility].     Extended.

## 2020-08-11 NOTE — LETTER
August 11, 2020      Bhavik Alex III, MD  [address] [address]           [facility]  [address] [address]  Phone: [phone]          Patient: Carlos Vee   MR Number: [id number]   YOB: 1960   Date of Visit: 8/11/2020       Dear Dr. Perry Beatty:    Thank you for referring Carlos Vee to me for evaluation. Attached you will find relevant portions of my assessment and plan of care.    If you have questions, please do not hesitate to call me. I look forward to following Carlos Vee along with you.    Sincerely,    Dacia Reis MD    Enclosure  CC:  No Recipients    If you would like to receive this communication electronically, please contact [email or web address] or [phone] to request more information on [email or web address] Link access.    For providers and/or their staff who would like to refer a patient to [facility], please contact us through our one-stop-shop provider referral line, [facility], at [phone].    If you feel you have received this communication in error or would no longer like to receive these types of communications, please e-mail [email or web address]

## 2020-08-11 NOTE — ANESTHESIA PAT ROS NOTE
08/11/2020  Carlos Vee is a 60 y.o., male.      Pre-op Assessment          Review of Systems  Anesthesia Hx:  No problems with previous Anesthesia  Denies Family Hx of Anesthesia complications.   Denies Personal Hx of Anesthesia complications.   Social:  No Alcohol Use, Former Smoker    Hematology/Oncology:     Oncology Normal   Hematology Comments: HIV + undetectable for last 10yrs   EENT/Dental:EENT/Dental Normal   Cardiovascular:    Denies Angina.  Functional Capacity good / => 4 METS  Varicose Veins    Pulmonary:   Denies Shortness of breath.  Denies Recent URI.  Pulmonary Infection:  Pneumonia (10 yrs ago while briefly off HIV meds). , past history (> 3 months ago).   Renal/:  Renal/ Normal     Hepatic/GI:  Hepatic/GI Normal    Musculoskeletal:  Musculoskeletal General/Symptoms: joint pain, joint stiffness. Functional capacity is ambulatory without assistance.  Joint Disease:  Arthritis, Osteoarthritis, knee    Neurological:  Arthritis, Osteoarthritis, knee    Endocrine:  Endocrine Normal    Psych:  Psychiatric Normal           Physical Exam  General:  Well nourished    Airway/Jaw/Neck:  Airway Findings: Mouth Opening: Normal Tongue: Normal  Jaw/Neck Findings:  Neck ROM: Normal ROM      Dental:  Dental Findings: Upper Dentures, Lower Dentures   Chest/Lungs:  Chest/Lungs Findings: Clear to auscultation, Normal Respiratory Rate     Heart/Vascular:  Heart Findings: Rate: Normal        Mental Status:  Mental Status Findings:  Cooperative, Alert and Oriented         8/11/20 Dr Reis clearance noted:  Preoperative cardiac risk assessment-  The patient does not have any active cardiac conditions . Revised cardiac risk index predictors-0 ---.Functional capacity is more than 4 Mets. He will be undergoing a Orthopedic procedure that carries a Moderate Risk risk   Risk of a major Cardiac event (  Defined as death, myocardial infarction, or cardiac arrest at 30 days after noncardiac surgery), based on RCRI score      -3.9%         No further cardiac work up is indicated prior to proceeding with the surgery            American Society of Anesthesiologists Physical status classification ( ASA ) class: 2     Postoperative pulmonary complication risk assessment:      ARISCAT ( Canet) risk index- risk class -  Low, if duration of surgery is under 3 hours, intermediate, if duration of surgery is over 3 hours

## 2020-08-11 NOTE — ASSESSMENT & PLAN NOTE
2019   Low bone density/osteopenia is present in the right proximal femur  lying just below the average patient's age-matched control consistent  with age-appropriate physiologic versus early accelerated bone  Demineralization.    No fracture history     On Vitamin D

## 2020-08-11 NOTE — OUTPATIENT SUBJECTIVE & OBJECTIVE
"Outpatient Subjective & Objective     Chief complaint-Preoperative evaluation, Perioperative Medical management, complication reduction plan     Active cardiac conditions- none    Revised cardiac risk index predictors- none    Functional capacity -Examples of physical activity , walks a lot at work- Works out 3 times a week ,Lifts weights  can take 1 flight of stairs----- He can undertake all the above activities without  chest pain,chest tightness, Shortness of breath ,dizziness,lightheadedness making his exercise tolerance more,   than 4 Mets.       Review of Systems   Constitutional: Negative for chills and fever.        No unusual weight changes     HENT:        STOPBANG score 3 / 8        Age over 50 years  Neck size over 40 CM  Male gender   Eyes:        No unusual vision changes   Respiratory:        No cough , phlegm    No Hemoptysis   Cardiovascular:        As noted   Gastrointestinal:        Bowels- Regular  No overt GI/ blood losses  Suggested follow up regarding Colonoscopy    Endocrine:        Prednisone use > 20 mg daily for 3 weeks- none   Genitourinary: Negative for dysuria.        No urinary hesitancy    Musculoskeletal:        As above      Skin: Negative for rash.   Neurological: Negative for syncope.        No unilateral weakness   Hematological:        Current use of Anticoagulants  None   Psychiatric/Behavioral:        No Depression,Anxiety     no testicular lumps   No vascular stenting     No past medical history pertinent negatives.        No anesthesia, bleeding, cardiac problems , PONVwith previous surgeries/procedures.  Medications and Allergies reviewed in epic.   FH- No anesthesia,bleeding / venous thrombosis ,problems in family     Physical Exam  Blood pressure 106/74, pulse 61, height 6' 1" (1.854 m), weight 83.6 kg (184 lb 3.2 oz), SpO2 98 %.      Physical Exam  Constitutional- Vitals - Body mass index is 24.3 kg/m².,   Vitals:    08/11/20 1015   BP: 106/74   Pulse: 61     General " appearance-Conscious,Coherent  Eyes- No conjunctival icterus,pupils  round  and reactive to light   ENT-Oral cavity- moist ,  upper denture and lower denture  , Hearing grossly normal   Neck- No thyromegaly ,Trachea -central, No jugular venous distension,   No Carotid Bruit   Cardiovascular -Heart Sounds- Normal  and  no murmur   , No gallop rhythm   Respiratory - Normal Respiratory Effort, Normal breath sounds,  no wheeze  and  no forced expiratory wheeze    Peripheral pitting pedal edema-- none ,  varicose veins right lower extremity  and  varicose veins left lower extremity, no calf pain   Gastrointestinal -Soft abdomen, No palpable masses, Non Tender,Liver,Spleen not palpable. No-- free fluid and shifting dullness  Musculoskeletal- No finger Clubbing. Strength grossly normal   Lymphatic-No Palpable cervical, axillary, lymphadenopathy   Psychiatric - normal effect,Orientation  Rt Dorsalis pedis pulses-palpable    Lt Dorsalis pedis pulses- palpable   Rt Posterior tibial pulses -palpable   Left posterior tibial pulses -palpable   Miscellaneous -  Surgical scarLeft groin ,  no renal bruit and  bowel sounds positive   Investigations  Lab and Imaging have been reviewed in epic.    Review of Medicine tests    EKG- --May 2019   It was reported to be showing     Sinus bradycardia  Otherwise normal ECG                Review of old records- Was done and information gathered regards to events leading to surgery and health conditions of significance in the perioperative period.    Outpatient Subjective & Objective

## 2020-08-11 NOTE — ASSESSMENT & PLAN NOTE
Diagnosed 2004  Acquired through sexual route - male   Follows safe sex  Under Infectious disease care   On Treatment since 2004   Last CD4  Count about 340 - about 3 months ago   Viral load undetectable   No other STD     Not known to have Tuberculosis , thrush     Chart review    CD4 count Feb 202- 340       Not known to have Hep C      Had Pneumonia in 2012 when he was off HIV Medication - has been on it since   No aspiration, no chocking , no acid reflux

## 2020-08-12 ENCOUNTER — ANESTHESIA (OUTPATIENT)
Dept: SURGERY | Facility: HOSPITAL | Age: 60
DRG: 470 | End: 2020-08-12
Payer: COMMERCIAL

## 2020-08-12 ENCOUNTER — HOSPITAL ENCOUNTER (INPATIENT)
Facility: HOSPITAL | Age: 60
LOS: 2 days | Discharge: HOME | DRG: 470 | End: 2020-08-14
Attending: ORTHOPAEDIC SURGERY | Admitting: ORTHOPAEDIC SURGERY
Payer: COMMERCIAL

## 2020-08-12 DIAGNOSIS — M17.11 PRIMARY OSTEOARTHRITIS OF RIGHT KNEE: ICD-10-CM

## 2020-08-12 LAB
GRAM STN SPEC: NORMAL
POCT GLUCOSE: 166 MG/DL (ref 70–110)

## 2020-08-12 PROCEDURE — 71000033 HC RECOVERY, INTIAL HOUR: Performed by: ORTHOPAEDIC SURGERY

## 2020-08-12 PROCEDURE — 25000003 PHARM REV CODE 250: Performed by: PHYSICIAN ASSISTANT

## 2020-08-12 PROCEDURE — D9220A PRA ANESTHESIA: ICD-10-PCS | Mod: COE,,, | Performed by: ANESTHESIOLOGY

## 2020-08-12 PROCEDURE — 36000711: Performed by: ORTHOPAEDIC SURGERY

## 2020-08-12 PROCEDURE — 27447 PR TOTAL KNEE ARTHROPLASTY: ICD-10-PCS | Mod: 22,AS,RT,COE | Performed by: PHYSICIAN ASSISTANT

## 2020-08-12 PROCEDURE — C1776 JOINT DEVICE (IMPLANTABLE): HCPCS | Performed by: ORTHOPAEDIC SURGERY

## 2020-08-12 PROCEDURE — 27200688 HC TRAY, SPINAL-HYPER/ ISOBARIC: Performed by: ANESTHESIOLOGY

## 2020-08-12 PROCEDURE — 27447 TOTAL KNEE ARTHROPLASTY: CPT | Mod: 22,RT,COE, | Performed by: ORTHOPAEDIC SURGERY

## 2020-08-12 PROCEDURE — D9220A PRA ANESTHESIA: Mod: COE,,, | Performed by: ANESTHESIOLOGY

## 2020-08-12 PROCEDURE — 36000710: Performed by: ORTHOPAEDIC SURGERY

## 2020-08-12 PROCEDURE — 99900035 HC TECH TIME PER 15 MIN (STAT)

## 2020-08-12 PROCEDURE — 25000003 PHARM REV CODE 250: Performed by: NURSE ANESTHETIST, CERTIFIED REGISTERED

## 2020-08-12 PROCEDURE — 63600175 PHARM REV CODE 636 W HCPCS: Performed by: NURSE ANESTHETIST, CERTIFIED REGISTERED

## 2020-08-12 PROCEDURE — 25000003 PHARM REV CODE 250: Performed by: ORTHOPAEDIC SURGERY

## 2020-08-12 PROCEDURE — 94761 N-INVAS EAR/PLS OXIMETRY MLT: CPT

## 2020-08-12 PROCEDURE — 63600175 PHARM REV CODE 636 W HCPCS: Performed by: PHYSICIAN ASSISTANT

## 2020-08-12 PROCEDURE — 27447 PR TOTAL KNEE ARTHROPLASTY: ICD-10-PCS | Mod: 22,RT,COE, | Performed by: ORTHOPAEDIC SURGERY

## 2020-08-12 PROCEDURE — 27447 TOTAL KNEE ARTHROPLASTY: CPT | Mod: 22,AS,RT,COE | Performed by: PHYSICIAN ASSISTANT

## 2020-08-12 PROCEDURE — 87116 MYCOBACTERIA CULTURE: CPT | Mod: 59

## 2020-08-12 PROCEDURE — 94799 UNLISTED PULMONARY SVC/PX: CPT

## 2020-08-12 PROCEDURE — 27100025 HC TUBING, SET FLUID WARMER: Performed by: ANESTHESIOLOGY

## 2020-08-12 PROCEDURE — 87206 SMEAR FLUORESCENT/ACID STAI: CPT

## 2020-08-12 PROCEDURE — S0028 INJECTION, FAMOTIDINE, 20 MG: HCPCS | Performed by: NURSE ANESTHETIST, CERTIFIED REGISTERED

## 2020-08-12 PROCEDURE — 97161 PT EVAL LOW COMPLEX 20 MIN: CPT

## 2020-08-12 PROCEDURE — 27201423 OPTIME MED/SURG SUP & DEVICES STERILE SUPPLY: Performed by: ORTHOPAEDIC SURGERY

## 2020-08-12 PROCEDURE — C1751 CATH, INF, PER/CENT/MIDLINE: HCPCS | Performed by: ANESTHESIOLOGY

## 2020-08-12 PROCEDURE — 37000009 HC ANESTHESIA EA ADD 15 MINS: Performed by: ORTHOPAEDIC SURGERY

## 2020-08-12 PROCEDURE — 76942 ECHO GUIDE FOR BIOPSY: CPT | Performed by: STUDENT IN AN ORGANIZED HEALTH CARE EDUCATION/TRAINING PROGRAM

## 2020-08-12 PROCEDURE — 88305 TISSUE EXAM BY PATHOLOGIST: CPT | Performed by: PATHOLOGY

## 2020-08-12 PROCEDURE — 97116 GAIT TRAINING THERAPY: CPT

## 2020-08-12 PROCEDURE — D9220A PRA ANESTHESIA: ICD-10-PCS | Mod: COE,,, | Performed by: NURSE ANESTHETIST, CERTIFIED REGISTERED

## 2020-08-12 PROCEDURE — 88305 TISSUE EXAM BY PATHOLOGIST: CPT | Mod: 26,COE,, | Performed by: PATHOLOGY

## 2020-08-12 PROCEDURE — 63600175 PHARM REV CODE 636 W HCPCS: Performed by: ORTHOPAEDIC SURGERY

## 2020-08-12 PROCEDURE — 87205 SMEAR GRAM STAIN: CPT | Mod: 59

## 2020-08-12 PROCEDURE — 87070 CULTURE OTHR SPECIMN AEROBIC: CPT | Mod: 59

## 2020-08-12 PROCEDURE — 64448 PR NERVE BLOCK INJ, ANES/STEROID, FEMORAL, CONT INFUSION, INCL IMAG GUIDANCE: ICD-10-PCS | Mod: 59,RT,COE, | Performed by: ANESTHESIOLOGY

## 2020-08-12 PROCEDURE — 87015 SPECIMEN INFECT AGNT CONCNTJ: CPT

## 2020-08-12 PROCEDURE — 87102 FUNGUS ISOLATION CULTURE: CPT | Mod: 59

## 2020-08-12 PROCEDURE — 76942 PR U/S GUIDANCE FOR NEEDLE GUIDANCE: ICD-10-PCS | Mod: 26,COE,, | Performed by: ANESTHESIOLOGY

## 2020-08-12 PROCEDURE — 97165 OT EVAL LOW COMPLEX 30 MIN: CPT

## 2020-08-12 PROCEDURE — 63600175 PHARM REV CODE 636 W HCPCS: Performed by: ANESTHESIOLOGY

## 2020-08-12 PROCEDURE — 11000001 HC ACUTE MED/SURG PRIVATE ROOM

## 2020-08-12 PROCEDURE — D9220A PRA ANESTHESIA: Mod: COE,,, | Performed by: NURSE ANESTHETIST, CERTIFIED REGISTERED

## 2020-08-12 PROCEDURE — 87075 CULTR BACTERIA EXCEPT BLOOD: CPT | Mod: 59

## 2020-08-12 PROCEDURE — 37000008 HC ANESTHESIA 1ST 15 MINUTES: Performed by: ORTHOPAEDIC SURGERY

## 2020-08-12 PROCEDURE — 76942 ECHO GUIDE FOR BIOPSY: CPT | Mod: 26,COE,, | Performed by: ANESTHESIOLOGY

## 2020-08-12 PROCEDURE — 88305 TISSUE EXAM BY PATHOLOGIST: ICD-10-PCS | Mod: 26,COE,, | Performed by: PATHOLOGY

## 2020-08-12 PROCEDURE — C1713 ANCHOR/SCREW BN/BN,TIS/BN: HCPCS | Performed by: ORTHOPAEDIC SURGERY

## 2020-08-12 PROCEDURE — 94770 HC EXHALED C02 TEST: CPT

## 2020-08-12 PROCEDURE — 64448 NJX AA&/STRD FEM NRV NFS IMG: CPT | Mod: 59,RT,COE, | Performed by: ANESTHESIOLOGY

## 2020-08-12 PROCEDURE — 64448 NJX AA&/STRD FEM NRV NFS IMG: CPT | Performed by: STUDENT IN AN ORGANIZED HEALTH CARE EDUCATION/TRAINING PROGRAM

## 2020-08-12 DEVICE — CEMENT BONE WHOLE BATCH: Type: IMPLANTABLE DEVICE | Site: KNEE | Status: FUNCTIONAL

## 2020-08-12 DEVICE — COMPONENT FEM PS CEM SZ4 R: Type: IMPLANTABLE DEVICE | Site: KNEE | Status: FUNCTIONAL

## 2020-08-12 DEVICE — TRAY TIBIAL CEMENT SZ 4 COBALT: Type: IMPLANTABLE DEVICE | Site: KNEE | Status: FUNCTIONAL

## 2020-08-12 DEVICE — PATELLA COMP 3-PEG OVAL D: Type: IMPLANTABLE DEVICE | Site: KNEE | Status: FUNCTIONAL

## 2020-08-12 DEVICE — INSERT TIBIAL SZ 4 10MM PFC: Type: IMPLANTABLE DEVICE | Site: KNEE | Status: FUNCTIONAL

## 2020-08-12 RX ORDER — FENTANYL CITRATE 50 UG/ML
25 INJECTION, SOLUTION INTRAMUSCULAR; INTRAVENOUS EVERY 5 MIN PRN
Status: DISCONTINUED | OUTPATIENT
Start: 2020-08-12 | End: 2020-08-12 | Stop reason: HOSPADM

## 2020-08-12 RX ORDER — MORPHINE SULFATE 2 MG/ML
2 INJECTION, SOLUTION INTRAMUSCULAR; INTRAVENOUS
Status: DISCONTINUED | OUTPATIENT
Start: 2020-08-12 | End: 2020-08-14 | Stop reason: HOSPADM

## 2020-08-12 RX ORDER — ASPIRIN 81 MG/1
81 TABLET ORAL 2 TIMES DAILY
Status: DISCONTINUED | OUTPATIENT
Start: 2020-08-12 | End: 2020-08-14 | Stop reason: HOSPADM

## 2020-08-12 RX ORDER — SODIUM CHLORIDE 9 MG/ML
INJECTION, SOLUTION INTRAVENOUS CONTINUOUS PRN
Status: DISCONTINUED | OUTPATIENT
Start: 2020-08-12 | End: 2020-08-12

## 2020-08-12 RX ORDER — ESMOLOL HYDROCHLORIDE 10 MG/ML
INJECTION INTRAVENOUS
Status: DISCONTINUED | OUTPATIENT
Start: 2020-08-12 | End: 2020-08-12

## 2020-08-12 RX ORDER — CELECOXIB 200 MG/1
400 CAPSULE ORAL
Status: COMPLETED | OUTPATIENT
Start: 2020-08-12 | End: 2020-08-12

## 2020-08-12 RX ORDER — TALC
6 POWDER (GRAM) TOPICAL NIGHTLY PRN
Status: DISCONTINUED | OUTPATIENT
Start: 2020-08-12 | End: 2020-08-12 | Stop reason: HOSPADM

## 2020-08-12 RX ORDER — POLYETHYLENE GLYCOL 3350 17 G/17G
17 POWDER, FOR SOLUTION ORAL DAILY
Status: DISCONTINUED | OUTPATIENT
Start: 2020-08-12 | End: 2020-08-14 | Stop reason: HOSPADM

## 2020-08-12 RX ORDER — ONDANSETRON 2 MG/ML
4 INJECTION INTRAMUSCULAR; INTRAVENOUS EVERY 8 HOURS PRN
Status: DISCONTINUED | OUTPATIENT
Start: 2020-08-12 | End: 2020-08-14 | Stop reason: HOSPADM

## 2020-08-12 RX ORDER — PROPOFOL 10 MG/ML
VIAL (ML) INTRAVENOUS CONTINUOUS PRN
Status: DISCONTINUED | OUTPATIENT
Start: 2020-08-12 | End: 2020-08-12

## 2020-08-12 RX ORDER — ACETAMINOPHEN 500 MG
1000 TABLET ORAL EVERY 6 HOURS
Status: DISCONTINUED | OUTPATIENT
Start: 2020-08-12 | End: 2020-08-14 | Stop reason: HOSPADM

## 2020-08-12 RX ORDER — PREGABALIN 75 MG/1
75 CAPSULE ORAL NIGHTLY
Status: DISCONTINUED | OUTPATIENT
Start: 2020-08-12 | End: 2020-08-14 | Stop reason: HOSPADM

## 2020-08-12 RX ORDER — PREGABALIN 75 MG/1
150 CAPSULE ORAL
Status: COMPLETED | OUTPATIENT
Start: 2020-08-12 | End: 2020-08-12

## 2020-08-12 RX ORDER — ROPIVACAINE HYDROCHLORIDE 5 MG/ML
INJECTION, SOLUTION EPIDURAL; INFILTRATION; PERINEURAL
Status: DISCONTINUED | OUTPATIENT
Start: 2020-08-12 | End: 2020-08-12

## 2020-08-12 RX ORDER — MIDAZOLAM HYDROCHLORIDE 1 MG/ML
1 INJECTION INTRAMUSCULAR; INTRAVENOUS EVERY 5 MIN PRN
Status: DISCONTINUED | OUTPATIENT
Start: 2020-08-12 | End: 2020-08-12 | Stop reason: HOSPADM

## 2020-08-12 RX ORDER — VANCOMYCIN HYDROCHLORIDE 1 G/20ML
INJECTION, POWDER, LYOPHILIZED, FOR SOLUTION INTRAVENOUS
Status: DISCONTINUED | OUTPATIENT
Start: 2020-08-12 | End: 2020-08-12 | Stop reason: HOSPADM

## 2020-08-12 RX ORDER — TRANEXAMIC ACID 100 MG/ML
1000 INJECTION, SOLUTION INTRAVENOUS
Status: DISCONTINUED | OUTPATIENT
Start: 2020-08-12 | End: 2020-08-12 | Stop reason: HOSPADM

## 2020-08-12 RX ORDER — ACETAMINOPHEN 500 MG
1000 TABLET ORAL
Status: COMPLETED | OUTPATIENT
Start: 2020-08-12 | End: 2020-08-12

## 2020-08-12 RX ORDER — KETAMINE HCL IN 0.9 % NACL 50 MG/5 ML
SYRINGE (ML) INTRAVENOUS
Status: DISCONTINUED | OUTPATIENT
Start: 2020-08-12 | End: 2020-08-12

## 2020-08-12 RX ORDER — CEFAZOLIN SODIUM 1 G/3ML
2 INJECTION, POWDER, FOR SOLUTION INTRAMUSCULAR; INTRAVENOUS
Status: COMPLETED | OUTPATIENT
Start: 2020-08-12 | End: 2020-08-12

## 2020-08-12 RX ORDER — OXYCODONE HYDROCHLORIDE 10 MG/1
10 TABLET ORAL
Status: DISCONTINUED | OUTPATIENT
Start: 2020-08-12 | End: 2020-08-14 | Stop reason: HOSPADM

## 2020-08-12 RX ORDER — MUPIROCIN 20 MG/G
1 OINTMENT TOPICAL
Status: COMPLETED | OUTPATIENT
Start: 2020-08-12 | End: 2020-08-12

## 2020-08-12 RX ORDER — ROPIVACAINE HYDROCHLORIDE 2 MG/ML
8 INJECTION, SOLUTION EPIDURAL; INFILTRATION; PERINEURAL CONTINUOUS
Status: DISCONTINUED | OUTPATIENT
Start: 2020-08-12 | End: 2020-08-13

## 2020-08-12 RX ORDER — ROPIVACAINE/EPI/CLONIDINE/KET 2.46-0.005
SYRINGE (ML) INJECTION
Status: DISCONTINUED | OUTPATIENT
Start: 2020-08-12 | End: 2020-08-12 | Stop reason: HOSPADM

## 2020-08-12 RX ORDER — SODIUM CHLORIDE 9 MG/ML
INJECTION, SOLUTION INTRAVENOUS
Status: COMPLETED | OUTPATIENT
Start: 2020-08-12 | End: 2020-08-12

## 2020-08-12 RX ORDER — SODIUM CHLORIDE 0.9 % (FLUSH) 0.9 %
10 SYRINGE (ML) INJECTION
Status: DISCONTINUED | OUTPATIENT
Start: 2020-08-12 | End: 2020-08-12 | Stop reason: HOSPADM

## 2020-08-12 RX ORDER — AMOXICILLIN 250 MG
1 CAPSULE ORAL 2 TIMES DAILY
Status: DISCONTINUED | OUTPATIENT
Start: 2020-08-12 | End: 2020-08-14 | Stop reason: HOSPADM

## 2020-08-12 RX ORDER — SODIUM CHLORIDE 0.9 % (FLUSH) 0.9 %
3 SYRINGE (ML) INJECTION EVERY 6 HOURS
Status: DISCONTINUED | OUTPATIENT
Start: 2020-08-12 | End: 2020-08-12 | Stop reason: HOSPADM

## 2020-08-12 RX ORDER — MUPIROCIN 20 MG/G
1 OINTMENT TOPICAL 2 TIMES DAILY
Status: DISCONTINUED | OUTPATIENT
Start: 2020-08-12 | End: 2020-08-14 | Stop reason: HOSPADM

## 2020-08-12 RX ORDER — SODIUM CHLORIDE 9 MG/ML
INJECTION, SOLUTION INTRAVENOUS CONTINUOUS
Status: ACTIVE | OUTPATIENT
Start: 2020-08-12 | End: 2020-08-13

## 2020-08-12 RX ORDER — DEXAMETHASONE SODIUM PHOSPHATE 4 MG/ML
INJECTION, SOLUTION INTRA-ARTICULAR; INTRALESIONAL; INTRAMUSCULAR; INTRAVENOUS; SOFT TISSUE
Status: DISCONTINUED | OUTPATIENT
Start: 2020-08-12 | End: 2020-08-12

## 2020-08-12 RX ORDER — CELECOXIB 200 MG/1
200 CAPSULE ORAL DAILY
Status: DISCONTINUED | OUTPATIENT
Start: 2020-08-13 | End: 2020-08-14 | Stop reason: HOSPADM

## 2020-08-12 RX ORDER — ONDANSETRON 2 MG/ML
INJECTION INTRAMUSCULAR; INTRAVENOUS
Status: DISCONTINUED | OUTPATIENT
Start: 2020-08-12 | End: 2020-08-12

## 2020-08-12 RX ORDER — OXYCODONE HYDROCHLORIDE 5 MG/1
5 TABLET ORAL
Status: DISCONTINUED | OUTPATIENT
Start: 2020-08-12 | End: 2020-08-14 | Stop reason: HOSPADM

## 2020-08-12 RX ORDER — MIDAZOLAM HYDROCHLORIDE 1 MG/ML
INJECTION, SOLUTION INTRAMUSCULAR; INTRAVENOUS
Status: DISCONTINUED | OUTPATIENT
Start: 2020-08-12 | End: 2020-08-12

## 2020-08-12 RX ORDER — FAMOTIDINE 20 MG/1
20 TABLET, FILM COATED ORAL 2 TIMES DAILY
Status: DISCONTINUED | OUTPATIENT
Start: 2020-08-12 | End: 2020-08-14 | Stop reason: HOSPADM

## 2020-08-12 RX ORDER — FAMOTIDINE 10 MG/ML
INJECTION INTRAVENOUS
Status: DISCONTINUED | OUTPATIENT
Start: 2020-08-12 | End: 2020-08-12

## 2020-08-12 RX ORDER — TRANEXAMIC ACID 100 MG/ML
INJECTION, SOLUTION INTRAVENOUS
Status: DISCONTINUED | OUTPATIENT
Start: 2020-08-12 | End: 2020-08-12

## 2020-08-12 RX ORDER — NALOXONE HCL 0.4 MG/ML
0.02 VIAL (ML) INJECTION
Status: DISCONTINUED | OUTPATIENT
Start: 2020-08-12 | End: 2020-08-14 | Stop reason: HOSPADM

## 2020-08-12 RX ORDER — LIDOCAINE HYDROCHLORIDE 10 MG/ML
1 INJECTION, SOLUTION EPIDURAL; INFILTRATION; INTRACAUDAL; PERINEURAL
Status: DISCONTINUED | OUTPATIENT
Start: 2020-08-12 | End: 2020-08-12 | Stop reason: HOSPADM

## 2020-08-12 RX ORDER — BISACODYL 10 MG
10 SUPPOSITORY, RECTAL RECTAL EVERY 12 HOURS PRN
Status: DISCONTINUED | OUTPATIENT
Start: 2020-08-12 | End: 2020-08-14 | Stop reason: HOSPADM

## 2020-08-12 RX ORDER — LIDOCAINE HCL/PF 100 MG/5ML
SYRINGE (ML) INTRAVENOUS
Status: DISCONTINUED | OUTPATIENT
Start: 2020-08-12 | End: 2020-08-12

## 2020-08-12 RX ORDER — CEFAZOLIN SODIUM 1 G/3ML
2 INJECTION, POWDER, FOR SOLUTION INTRAMUSCULAR; INTRAVENOUS
Status: COMPLETED | OUTPATIENT
Start: 2020-08-12 | End: 2020-08-13

## 2020-08-12 RX ADMIN — SODIUM CHLORIDE: 0.9 INJECTION, SOLUTION INTRAVENOUS at 05:08

## 2020-08-12 RX ADMIN — SODIUM CHLORIDE: 0.9 INJECTION, SOLUTION INTRAVENOUS at 08:08

## 2020-08-12 RX ADMIN — SODIUM CHLORIDE, SODIUM GLUCONATE, SODIUM ACETATE, POTASSIUM CHLORIDE, MAGNESIUM CHLORIDE, SODIUM PHOSPHATE, DIBASIC, AND POTASSIUM PHOSPHATE: .53; .5; .37; .037; .03; .012; .00082 INJECTION, SOLUTION INTRAVENOUS at 10:08

## 2020-08-12 RX ADMIN — TRANEXAMIC ACID 1000 MG: 100 INJECTION, SOLUTION INTRAVENOUS at 12:08

## 2020-08-12 RX ADMIN — PREGABALIN 75 MG: 75 CAPSULE ORAL at 08:08

## 2020-08-12 RX ADMIN — FAMOTIDINE 20 MG: 10 INJECTION, SOLUTION INTRAVENOUS at 10:08

## 2020-08-12 RX ADMIN — ROPIVACAINE HYDROCHLORIDE 50 MG: 5 INJECTION, SOLUTION EPIDURAL; INFILTRATION; PERINEURAL at 09:08

## 2020-08-12 RX ADMIN — SODIUM CHLORIDE, SODIUM GLUCONATE, SODIUM ACETATE, POTASSIUM CHLORIDE, MAGNESIUM CHLORIDE, SODIUM PHOSPHATE, DIBASIC, AND POTASSIUM PHOSPHATE: .53; .5; .37; .037; .03; .012; .00082 INJECTION, SOLUTION INTRAVENOUS at 12:08

## 2020-08-12 RX ADMIN — Medication 10 MG: at 12:08

## 2020-08-12 RX ADMIN — FAMOTIDINE 20 MG: 20 TABLET, FILM COATED ORAL at 08:08

## 2020-08-12 RX ADMIN — CEFAZOLIN 2 G: 330 INJECTION, POWDER, FOR SOLUTION INTRAMUSCULAR; INTRAVENOUS at 05:08

## 2020-08-12 RX ADMIN — OXYCODONE 5 MG: 5 TABLET ORAL at 02:08

## 2020-08-12 RX ADMIN — FENTANYL CITRATE 50 MCG: 50 INJECTION INTRAMUSCULAR; INTRAVENOUS at 09:08

## 2020-08-12 RX ADMIN — ROPIVACAINE HYDROCHLORIDE 8 ML/HR: 2 INJECTION, SOLUTION EPIDURAL; INFILTRATION at 01:08

## 2020-08-12 RX ADMIN — ESMOLOL HYDROCHLORIDE 20 MG: 10 INJECTION INTRAVENOUS at 12:08

## 2020-08-12 RX ADMIN — MIDAZOLAM HYDROCHLORIDE 2 MG: 1 INJECTION, SOLUTION INTRAMUSCULAR; INTRAVENOUS at 10:08

## 2020-08-12 RX ADMIN — ACETAMINOPHEN 1000 MG: 500 TABLET ORAL at 05:08

## 2020-08-12 RX ADMIN — Medication 10 MG: at 10:08

## 2020-08-12 RX ADMIN — ONDANSETRON 4 MG: 2 INJECTION INTRAMUSCULAR; INTRAVENOUS at 10:08

## 2020-08-12 RX ADMIN — SODIUM CHLORIDE: 0.9 INJECTION, SOLUTION INTRAVENOUS at 01:08

## 2020-08-12 RX ADMIN — POLYETHYLENE GLYCOL 3350 17 G: 17 POWDER, FOR SOLUTION ORAL at 04:08

## 2020-08-12 RX ADMIN — PREGABALIN 150 MG: 75 CAPSULE ORAL at 08:08

## 2020-08-12 RX ADMIN — OXYCODONE 5 MG: 5 TABLET ORAL at 05:08

## 2020-08-12 RX ADMIN — PROPOFOL 75 MCG/KG/MIN: 10 INJECTION, EMULSION INTRAVENOUS at 10:08

## 2020-08-12 RX ADMIN — MUPIROCIN 1 G: 20 OINTMENT TOPICAL at 08:08

## 2020-08-12 RX ADMIN — OXYCODONE 5 MG: 5 TABLET ORAL at 08:08

## 2020-08-12 RX ADMIN — TRANEXAMIC ACID 1000 MG: 100 INJECTION, SOLUTION INTRAVENOUS at 10:08

## 2020-08-12 RX ADMIN — DEXAMETHASONE SODIUM PHOSPHATE 8 MG: 4 INJECTION, SOLUTION INTRAMUSCULAR; INTRAVENOUS at 10:08

## 2020-08-12 RX ADMIN — ACETAMINOPHEN 1000 MG: 500 TABLET ORAL at 08:08

## 2020-08-12 RX ADMIN — VANCOMYCIN HYDROCHLORIDE 1500 MG: 1.5 INJECTION, POWDER, LYOPHILIZED, FOR SOLUTION INTRAVENOUS at 08:08

## 2020-08-12 RX ADMIN — MIDAZOLAM HYDROCHLORIDE 2 MG: 1 INJECTION, SOLUTION INTRAMUSCULAR; INTRAVENOUS at 09:08

## 2020-08-12 RX ADMIN — CEFAZOLIN 2 G: 330 INJECTION, POWDER, FOR SOLUTION INTRAMUSCULAR; INTRAVENOUS at 10:08

## 2020-08-12 RX ADMIN — CELECOXIB 400 MG: 200 CAPSULE ORAL at 08:08

## 2020-08-12 RX ADMIN — LIDOCAINE HYDROCHLORIDE 100 MG: 20 INJECTION, SOLUTION INTRAVENOUS at 10:08

## 2020-08-12 NOTE — PLAN OF CARE
Problem: Occupational Therapy Goal  Goal: Occupational Therapy Goal  Description: Goals to be met by: 8-14-20    Patient will increase functional independence with ADLs by performing:    UE Dressing with Modified [place].  LE Dressing with Supervision.  Grooming while standing at sink with Supervision.  Toileting from toilet with Supervision for hygiene and clothing management.   Toilet transfer to toilet with Supervision.    Outcome: Ongoing, Progressing    OT evaluation with goals established. Patient educated on how to use urinal for use throughout the night. Patient opted to remain in chair and is aware to page nursing to get back into bed.     Ivis Casas, OT  8/12/2020

## 2020-08-12 NOTE — OP NOTE
[facility]  OPERATIVE NOTE    Date of Operation:   8/12/2020    Providers Performing:   Surgeon(s):  Bhavik Alex III, MD  Assistant: Fatou Martinez PA-C, no qualified resident available    Operative Procedure:   Right Total Knee Arthroplasty, CPT 57998  Lateral release patella, inside out technique    -22 modifier for severe arthritis and 15 degree flexion contracture along with patellar maltracking requiring lateral release, along with patellar cyst requiring prolonged operative time and increased case complexity and difficulty    Preoperative Diagnosis:   Right Knee Osteoarthritis, ICD-10 M17.11    Postoperative Diagnosis:   SAME    Components Used:   Depuy  Femur: Sigma PS Size 4  Tibia: Sigma fixed bearing PS Size 4  Patella: Sigma Oval Dome 41 mm  Tibial Insert: Sigma fixed bearing PS inset size 10 mm  2 packs cement: Simplex    Implant Name Type Inv. Item Serial No.  Lot No. LRB No. Used Action   CEMENT BONE WHOLE BATCH - [id number]  CEMENT BONE WHOLE BATCH  [employer]. [id number] Right 2 Implanted   PATELLA COMP 3-PEG OVAL D - [id number]  PATELLA COMP 3-PEG OVAL D  [employer] [id number] Right 1 Implanted   COMPONENT FEM PS JERRY SZ4 R - [id number]  COMPONENT FEM PS JERRY SZ4 R  [employer] [id number] Right 1 Implanted   TRAY TIBIAL CEMENT SZ 4 [place] - [id number]  TRAY TIBIAL CEMENT SZ 4 COBALT  [employer] [id number] Right 1 Implanted   INSERT TIBIAL SZ 4 10MM PFC - [id number]  INSERT TIBIAL SZ 4 10MM PFC  [employer] [id number] Right 1 Implanted       Anesthesia:   Spinal+epidural  Adductor canal block with catheter  GHULAM cocktail: DOMINICK    Estimated Blood Loss:   250 cc    Specimens:   Culture: patellar cyst swab x3, tissue for aerobic/anaerobic/AFB/fungal  Permanent pathology: tissue from patellar cyst    Complications:   None    Indications:   The patient has failed non-operative measures including medications, injections and activity modifications for their knee.  After an explanation of risks and  benefits of knee arthroplasty surgery, the patient wishes to proceed with surgery.    Operative Notes:   The patient was greeted in the pre-op holding area.  The patients knee was marked with a surgical marker by the surgeon.  Spinal-Epidural anesthesia was administered by the anesthesia team.  The patient was placed in the supine position on the OR table with all bony prominences padded.  A tourniquet was not used.  IV antibiotics were administered.  The leg was prepped and draped in the usual sterile fashion.  A timeout was performed and the correct patient, site and procedure were identified.    A midline incision was made with a standard medical parapatellar arthrotomy.  The standard medial capsular release was performed along with the lateral gutter.  The patella was mobilized from the lateral parapatellar ligament and bony osteophytes were removed.  The intercondylar notch was cleared of the ACL/PCL.    While clearing the fat pad from behind the patellar tendon, I encountered an area of yellow caseous/granular material at the inferior pole patella/patellar tendon junction. There was no gross purulence. I felt that this was most likely a very large patellar cyst, however, with his history of poorly controlled HIV infection and pneumonia that he described 10 years ago, I decided that due to his risk factors cultures and pathology were warranted. However, I felt comfortable that this was not consistent with a bacterial or fungal infection. Additionally, no pathologist within an hour and no micro lab was available for stat gram stain or frozen section. We therefore elected to proceed with surgery. . This area was cultured and a specimen was sent to pathology.     The surgery was as follows except the femur was cut first, then the tibia, then the patella.     The extramedullary tibial alignment guide was placed in the appropriate slope and varus/valgus orientation and the proximal cutting block secured by pins.   Measured resection with a 8mm cut was accounted for from the high side of the plateau, perpendicular to the ankle.  The cut was made with an oscillating saw.  We then obtained access to the femoral canal with the stepped drill.  The intramedullary shanae was placed in 6 degrees of valgus with a 11mm planned resection.  Our distal femoral cuts were made.  The knee was placed in extension and the medical and lateral meniscal remnants removed.  A spacer block was placed with the knee in extension checking for alignment and balance which we were happy with. He was tight in extension especially medially so we recut the distal femur at 7 degrees of valgus +2mm = 13mm total. A spacer block was placed with the knee in extension checking for alignment and balance which we were happy with.    The knee was then brought into flexion and the distal femoral gap assessed for appropriate rotation.  Our distal femoral sizing guide was placed and sized appropriately.  Guide pins were placed in the appropriate external rotation.  This was assessed with the 4 in 1 cutting block and the flexion gap sizing block which was appropriate.  The distal femoral preparation was completed after the anterior, posterior and chamfer cuts were made.  The box cutting guide was placed and assessed.  The box cut was made.    At this time the trial implants were placed and knee assessed for stability, and flexion arc.   The patella was found to have a large inferolateral cyst which was confluent with the area cultured a the proximal patellar tendon. This area was debrided with a curette, the anterior cortex was intact but the cystic area did cover the lateral inferior lug hole. The patella was prepared using free-hand technique and the three-holed lug drill guide was sized and appropriately assessed.      Patella tracking was found to be acceptable after an inside out lateral release was performed. He had significant lateral gutter dense scar tissue  presumably from his injury/patellar dislocation documented 10 years ago.     The tibial component rotation was marked. The trials were removed. The tibial component was positioned and the keel was drilled and punched.    The wound was copiously irrigated with BACTISURE AND THEN SALINE pulsitile lavage and the bony surfaces dried.  Cement was mixed on the back table and applied to all components.  Cementation of the femoral, tibial and patellar components was performed sequentially with removal of all excess cement. A trial insert was placed to provide compression while the cement dried and a 0.35% betadine solution was left to soak in the surgical field.     After the cement was dry, the trial poly insert was removed. Hemostasis was obtained with bovie electrocautery.  The knee was again copiously irrigated with pulsatile lavage. The final polyethylene insert was placed and the knee reduced.      1 gram of vancomycin powder was placed in the knee. The arthrotomy was closed with interrupted #1 vicryl suture and #2 quill, the subcuticular layer was closed with 2-0 vicryl suture and a running 4-0 monocryl was used to closed the skin surface.  Surgicel sealant was placed over the top of the incision and sterile dressing placed over the wound.    Sponge and needle counts were correct.    The first-assistant was critical to all steps of the operation, including retraction and leg stabilization during exposure and bone preparation, as well as the deep and superficial wound closure.  Dr. Alex performed and/or supervised the key portions of this surgical procedure, including evaluation of the bone cuts, reshaping of the bony elements, and insertion of the provisional and final components.    Postoperative Plan:  The patient will be anticoagulated with 81mg aspirin twice daily for one month.   They will receive 24 hours of post-operative antibiotics.    Activity will be weight bearing as tolerated.    Signed by: Bhavik LUCERO  Isai BUSH, MD

## 2020-08-12 NOTE — PROGRESS NOTES
Evaluated patient at approx 16:15    Patient had motor weakness with PT this afternoon with ankle dorsiflexion    Patient resting comfortably in chair with pillows under knee, adductor catheter infusing at 8  No complaints of pain  Of note, he took his own aspirin from his discharge meds so we will hold this evenings dose and resume tomorrow am, told patient only take his HIV meds from home not his post op Rx's    Dressings c/d/i  No dressings too tight but removed ace wrap and loosened polar care, teds hose not too tight    5/5 GS, 0/5 TA/EHL/peroneals  SILT symmetric med/lat/plantar/fdws RvL  2+ PT pulst, WWP  Compartments soft    Motor but not sensory block likely adductor catheter related, typically peroneal palsy from surgery is both  Did not correct significant valgus deformity or release IT band or posterolateral corner    Flex knee overnight with pillows  Removed tight dressings  Clamped adductor catheter and stopped pump    Discussed with anesthesia who will arrange overnight PA to eval patient at 1900 and if still has foot drop then pull the catheter.

## 2020-08-12 NOTE — PT/OT/SLP EVAL
"Occupational Therapy   Evaluation    Name: Carlos Vee  MRN: [id number]  Admitting Diagnosis:  Primary osteoarthritis of right knee Day of Surgery    Recommendations:     Discharge Recommendations: home  Discharge Equipment Recommendations:  walker, rolling, bedside commode  Barriers to discharge:       Assessment:     Carlos Vee is a 60 y.o. male with a medical diagnosis of Primary osteoarthritis of right knee.  Patient is s/p right TKA. Patient remained in chair throughout OT evaluation due to inability to dorsiflex at this time which MD is aware of. Patient was educated on how to use urinal for use throughout night vs getting up for bathroom. Patient would benefit from skilled OT needs s/p right TKA to increase independence with ADLs and ADL transfers.  Performance deficits affecting function: weakness, impaired self care skills, impaired functional mobilty, gait instability, impaired balance, decreased lower extremity function, decreased ROM, orthopedic precautions.      Rehab Prognosis: Good; patient would benefit from acute skilled OT services to address these deficits and reach maximum level of function.       Plan:     Patient to be seen daily to address the above listed problems via therapeutic exercises, therapeutic activities, self-care/home management  · Plan of Care Expires: 08/14/20  · Plan of Care Reviewed with: patient, mother    Subjective     Chief Complaint: "I still can not move my ankle up/down"  Patient/Family Comments/goals: "I do not know how to use a urinal"     Occupational Profile:  Living Environment: lives with son who is 30 in 1 level home no steps, walk in shower   Previous level of function: independent with ADLs  Roles and Routines:  at [employer]   Equipment Used at Home:  none  Assistance upon Discharge: staying at [facility] x 1 week with mother    Pain/Comfort:  · Pain Rating 1: 3/10  · Location - Side 1: Right  · Location - Orientation 1: " generalized  · Location 1: knee  · Pain Addressed 1: Pre-medicate for activity, Reposition, Distraction    Patients cultural, spiritual, [employer]ybeterian conflicts given the current situation: no    Objective:     Communicated with: RN prior to session.  Patient found up in chair with cryotherapy, FCD, perineural catheter, peripheral IV upon OT entry to room.    General Precautions: Standard, fall   Orthopedic Precautions:RLE weight bearing as tolerated   Braces: N/A     Occupational Performance:      Activities of Daily Living:  · Toileting: educated on use of urinal  to use throughout the night    Cognitive/Visual Perceptual:  Cognitive/Psychosocial Skills:     -       Oriented to: Person, Place and Time   -       Follows Commands/attention:Follows multistep  commands    Physical Exam:  Upper Extremity Range of Motion:     -       Right Upper Extremity: WFL  -       Left Upper Extremity: WFL  Upper Extremity Strength:    -       Right Upper Extremity: WFL  -       Left Upper Extremity: WFL    AMPAC 6 Click ADL:  AMPAC Total Score: 19    Treatment & Education:  -Patient educated to page nursing when wanting to return to bed   -Patient educated on role OT and plan of care s/p surgery at [facility], white board updated as needed   Education:    Patient left up in chair with all lines intact and RN and mother present    GOALS:   Multidisciplinary Problems     Occupational Therapy Goals        Problem: Occupational Therapy Goal    Goal Priority Disciplines Outcome Interventions   Occupational Therapy Goal     OT, PT/OT Ongoing, Progressing    Description: Goals to be met by: 8-14-20    Patient will increase functional independence with ADLs by performing:    UE Dressing with Modified [place].  LE Dressing with Supervision.  Grooming while standing at sink with Supervision.  Toileting from toilet with Supervision for hygiene and clothing management.   Toilet transfer to toilet with Supervision.                      History:     Past Medical History:   Diagnosis Date    Chronic pain     Disorder of kidney and ureter     Erectile dysfunction due to diseases classified elsewhere     HIV (human immunodeficiency virus infection)     Pneumonia 09/2012    Primary localized osteoarthrosis of the knee, right        Past Surgical History:   Procedure Laterality Date    HERNIA REPAIR  2003    Left groin       Time Tracking:     OT Date of Treatment: 08/12/20  OT Start Time: 1617  OT Stop Time: 1631  OT Total Time (min): 14 min    Billable Minutes:Evaluation 14    Ivis Casas OT  8/12/2020

## 2020-08-12 NOTE — ANESTHESIA PROCEDURE NOTES
Adductor Canal Catheter    Patient location during procedure: pre-op   Block not for primary anesthetic.  Reason for block: at surgeon's request and post-op pain management   Post-op Pain Location: Right knee pain  Start time: 8/12/2020 9:31 AM  Timeout: 8/12/2020 9:28 AM   End time: 8/12/2020 9:47 AM    Staffing  Authorizing Provider: Faizan Hartley MD  Performing Provider: Radha Brandon MD    Preanesthetic Checklist  Completed: patient identified, site marked, surgical consent, pre-op evaluation, timeout performed, IV checked, risks and benefits discussed and monitors and equipment checked  Peripheral Block  Patient position: supine  Prep: ChloraPrep and site prepped and draped  Patient monitoring: heart rate, cardiac monitor, continuous pulse ox, continuous capnometry and frequent blood pressure checks  Block type: adductor canal  Laterality: right  Injection technique: continuous  Needle  Needle type: Tuohy   Needle gauge: 17 G  Needle length: 3.5 in  Needle localization: anatomical landmarks and ultrasound guidance  Catheter type: spring wound  Catheter size: 19 G  Catheter at skin depth: 8 cm  Test dose: lidocaine 1.5% with Epi 1-to-200,000 and negative   -ultrasound image captured on disc.  Assessment  Injection assessment: negative aspiration, negative parasthesia and local visualized surrounding nerve  Paresthesia pain: none  Heart rate change: no  Slow fractionated injection: yes  Additional Notes  VSS.  [facility] RN monitoring vitals throughout procedure.  Patient tolerated procedure well.     Ropivacaine 0.25% with Epi 1:300K x 20 ml used for the block

## 2020-08-12 NOTE — INTERVAL H&P NOTE
The patient has been examined and the H&P has been reviewed:    I concur with the findings and no changes have occurred since H&P was written.    Anesthesia/Surgery risks, benefits and alternative options discussed and understood by patient/family.    In accordance with [facility] notifications, [facility] policy (tier 2 orthopedic condition causing severe pain), and guidance from my /section head, I believe this to be a time sensitive procedure that needs to be performed because delay will cause pain, distress, worsening of the underlying disease process, and/or further negative outcomes for the patient.    I discussed COVID-19 with the patient, they are aware of our current policies and procedures, were given the option of delaying surgery, and they elect to proceed      Active Hospital Problems    Diagnosis  POA    Primary osteoarthritis of right knee [M17.11]  Yes      Resolved Hospital Problems   No resolved problems to display.

## 2020-08-12 NOTE — ANESTHESIA PROCEDURE NOTES
Spinal    Diagnosis: Right knee OA  Patient location during procedure: OR  Start time: 8/12/2020 10:25 AM  Timeout: 8/12/2020 10:24 AM  End time: 8/12/2020 10:30 AM    Staffing  Authorizing Provider: Faizan Hartley MD  Performing Provider: Faizan Hartley MD    Preanesthetic Checklist  Completed: patient identified, site marked, surgical consent, pre-op evaluation, timeout performed, IV checked, risks and benefits discussed and monitors and equipment checked  Spinal Block  Patient position: sitting  Prep: ChloraPrep  Patient monitoring: heart rate, frequent blood pressure checks and continuous pulse ox  Approach: midline  Location: L3-4  Injection technique: single shot  CSF Fluid: clear free-flowing CSF  Needle  Needle type: Aleah   Needle gauge: 25 G  Needle length: 3.5 in  Additional Documentation: negative aspiration for heme and no paresthesia on injection  Needle localization: anatomical landmarks  Assessment  Sensory level: T8   Dermatomal levels determined by pinch or prick  Ease of block: easy  Patient's tolerance of the procedure: comfortable throughout block and no complaints  Additional Notes  No complications  Mepivacaine 60 mg for SAB

## 2020-08-12 NOTE — OPERATIVE NOTE ADDENDUM
Certification of Assistant at Surgery       Surgery Date: 8/12/2020     Participating Surgeons:  Surgeon(s) and Role:     * Bhavik Alex III, MD - Primary    Procedures:  Procedure(s) (LRB):  ARTHROPLASTY, KNEE / [facility] [facility]AC[facility] (Right)    Assistant Surgeon's Certification of Necessity:  I understand that section 1842 (b) (6) (d) of the Social Security Act generally prohibits Medicare Part B reasonable charge payment for the services of assistants at surgery in teaching hospitals when qualified residents are available to furnish such services. I certify that the services for which payment is claimed were medically necessary, and that no qualified resident was available to perform the services. I further understand that these services are subject to post-payment review by the Medicare carrier.      Fatou Martinez PA-C    08/12/2020  2:04 PM

## 2020-08-12 NOTE — NURSING TRANSFER
Nursing Transfer Note      8/12/2020     Transfer To: recovery suites from [facility]    Transfer via bed    Transfer with sapphire and alaris pump.    Transported by Betzaida RN , Regina RN    Medicines sent: ropivacaine 0.2% infusion, mupirocin in chart.    Chart send with patient: Yes    Notified: mother at bedside.    Polar ice intact as well.

## 2020-08-12 NOTE — PLAN OF CARE
Patient tolerated PT session fairly. He ambulated 100ft with RW, CGA, and right foot drop. Patient able to clear right toes throughout ambulation. Anesthesia, PA, and resident notified that no DF noted to right foot. Right knee flexed on two pillows at end of session.     Problem: Physical Therapy Goal  Goal: Physical Therapy Goal  Description: Goals to be met by: 2020    Patient will increase functional independence with mobility by performin. Supine to sit with supervision  2. Sit to stand transfer with Supervision  3. Gait x400 feet with Supervision using Rolling Walker  4. Ascend/Descend 6 inch curb step with Stand-by Assistance using Rolling Walker  5. Lower extremity exercise program x30 reps per handout, with supervision    Outcome: Ongoing, Progressing

## 2020-08-12 NOTE — ASSESSMENT & PLAN NOTE
60 y.o. male s/p R TKA 8/12/20  Foot drop - knee flexed    VS:  Stable   Nerve block:  Adductor PNC, periop spinal  PT/OT:  WBAT  DVT PPx:  ASA 81 BID, FCDs  Cultures:  none  Abx:  Postop Ancef  Labs:  none  Drain:  none  Shi:  none    Dispo: f/u PT recs; OP PT scheduled 8/14/20; will discuss foot drop with staff

## 2020-08-12 NOTE — ANESTHESIA PREPROCEDURE EVALUATION
08/12/2020  Carlos Vee is a 60 y.o., male.      Anesthesia Evaluation          Review of Systems  Anesthesia Hx:  No problems with previous Anesthesia Denies Family Hx of Anesthesia complications.   Denies Personal Hx of Anesthesia complications.   Social:  No Alcohol Use, Former Smoker    Hematology/Oncology:     Oncology Normal   Hematology Comments: HIV + undetectable for last 10yrs   EENT/Dental:EENT/Dental Normal   Cardiovascular:    Denies Angina.  Functional Capacity good / => 4 METS  Varicose Veins    Pulmonary:   Denies Shortness of breath.  Denies Recent URI.  Pulmonary Infection:  Pneumonia (10 yrs ago while briefly off HIV meds). , past history (> 3 months ago).   Renal/:  Renal/ Normal     Hepatic/GI:  Hepatic/GI Normal    Musculoskeletal:  Musculoskeletal General/Symptoms: joint pain, joint stiffness. Functional capacity is ambulatory without assistance.  Joint Disease:  Arthritis, Osteoarthritis, knee    Neurological:  Arthritis, Osteoarthritis, knee    Endocrine:  Endocrine Normal    Psych:  Psychiatric Normal           Physical Exam  General:  Well nourished    Airway/Jaw/Neck:  Airway Findings: Mouth Opening: Normal Tongue: Normal  Mallampati: II  TM Distance: Normal, at least 6 cm  Jaw/Neck Findings:  Neck ROM: Normal ROM      Dental:  Dental Findings: Upper Dentures, Lower Dentures   Chest/Lungs:  Chest/Lungs Findings: Clear to auscultation, Normal Respiratory Rate     Heart/Vascular:  Heart Findings: Rate: Normal        Mental Status:  Mental Status Findings:  Cooperative, Alert and Oriented         8/11/20 Dr Reis clearance noted:  Preoperative cardiac risk assessment-  The patient does not have any active cardiac conditions . Revised cardiac risk index predictors-0 ---.Functional capacity is more than 4 Mets. He will be undergoing a Orthopedic procedure that carries a  Moderate Risk risk   Risk of a major Cardiac event ( Defined as death, myocardial infarction, or cardiac arrest at 30 days after noncardiac surgery), based on RCRI score      -3.9%         No further cardiac work up is indicated prior to proceeding with the surgery            American Society of Anesthesiologists Physical status classification ( ASA ) class: 2     Postoperative pulmonary complication risk assessment:      ARISCAT ( [place]) risk index- risk class -  Low, if duration of surgery is under 3 hours, intermediate, if duration of surgery is over 3 hours       Anesthesia Plan  Type of Anesthesia, risks & benefits discussed:  Anesthesia Type:  CSE, spinal, general  Patient's Preference: Neuraxial vs GA  Intra-op Monitoring Plan: standard ASA monitors  Intra-op Monitoring Plan Comments:   Post Op Pain Control Plan: multimodal analgesia, IV/PO Opiods PRN and peripheral nerve block  Post Op Pain Control Plan Comments:   Induction:   IV  Beta Blocker:  Patient is not currently on a Beta-Blocker (No further documentation required).       Informed Consent: Patient understands risks and agrees with Anesthesia plan.  Questions answered. Anesthesia consent signed with patient.  ASA Score: 2     Day of Surgery Review of History & Physical:    H&P update referred to the surgeon.     Anesthesia Plan Notes: Discussed Risks/Benefits of anesthetic plan  PMH was reviewed and PE was performed, Outside lab work reviewed  Will plan for neuraxial technique and convert to GA if needed        Ready For Surgery From Anesthesia Perspective.

## 2020-08-12 NOTE — TRANSFER OF CARE
"Anesthesia Transfer of Care Note    Patient: Carlos Vee    Procedure(s) Performed: Procedure(s) (LRB):  ARTHROPLASTY, KNEE / [facility] [facility] (Right)    Patient location: PACU    Transport from OR: Transported from OR on 6-10 L/min O2 by face mask with adequate spontaneous ventilation    Post pain: adequate analgesia    Post assessment: no apparent anesthetic complications    Post vital signs: stable    Level of consciousness: sedated    Nausea/Vomiting: no nausea/vomiting    Complications: none    Transfer of care protocol was followed      Last vitals:   Visit Vitals  BP (!) 95/58 (BP Location: Right arm, Patient Position: Lying)   Pulse (!) 51   Temp 36.8 °C (98.3 °F) (Oral)   Resp 11   Ht 6' 1" (1.854 m)   Wt 83.9 kg (185 lb)   SpO2 99%   BMI 24.41 kg/m²     "

## 2020-08-12 NOTE — PT/OT/SLP EVAL
Physical Therapy Evaluation and Treatment     Patient Name:  Carlos Vee   MRN:  [id number]    Recommendations:     Discharge Recommendations:  outpatient PT(8/14/2020)   Discharge Equipment Recommendations: bedside commode, walker, rolling   Barriers to discharge: None    Assessment:     Carlos Vee is a 60 y.o. male admitted with a medical diagnosis of Primary osteoarthritis of right knee.  He presents with the following impairments/functional limitations:  weakness, impaired functional mobilty, gait instability, impaired balance, decreased lower extremity function, pain, decreased ROM, impaired skin, impaired joint extensibility, orthopedic precautions. Patient tolerated PT session fairly. He ambulated 100ft with RW, CGA, and right foot drop. Patient able to clear right toes throughout ambulation. Anesthesia, PA, and resident notified that no DF noted to right foot. Right knee flexed on two pillows at end of session and loosened both ace and polar ice wrap.     Rehab Prognosis: Good; patient would benefit from acute skilled PT services to address these deficits and reach maximum level of function.    Recent Surgery: Procedure(s) (LRB):  ARTHROPLASTY, KNEE / [facility] [facility] (Right) Day of Surgery    Plan:     During this hospitalization, patient to be seen daily to address the identified rehab impairments via gait training, therapeutic activities, therapeutic exercises, neuromuscular re-education and progress toward the following goals:    · Plan of Care Expires:  08/14/20    Subjective     Chief Complaint: Pain in right anterior knee.   Patient/Family Comments/goals: To walk without pain and get back to working out.   Pain/Comfort:  · Pain Rating 1: 4/10  · Location - Side 1: Right  · Location - Orientation 1: generalized  · Location 1: knee  · Pain Addressed 1: Pre-medicate for activity, Cessation of Activity, Nurse notified  · Pain Rating Post-Intervention 1: 6/10    Living Environment:  Patient lives  alone in a H with no steps to enter. Prior to admission, patients level of function was independent for functional mobility. He works as the  for [employer] and enjoys working out 3x's/week. Equipment used at home: none. Upon discharge, patient will have assistance from mother and son.    Objective:     Communicated with RN prior to session.  Patient found supine with cryotherapy, FCD, perineural catheter, peripheral IV  upon PT entry to room.    General Precautions: Standard, fall   Orthopedic Precautions:RLE weight bearing as tolerated   Braces: N/A     Exams:  · Cognitive Exam:  Patient is oriented to Person, Place, Time and Situation  · Sensation:    · -       Impaired for light/touch right knee and lower leg with patient stating his leg still felt numb  · RLE ROM: WFL at hip. Knee limited due to pain from surgery. No DF noted but patient able to wiggle his toes.   · RLE Strength:  Grossly 3+/5 for hip and knee but did not formally assess due to pain. 0/5 DF.  · LLE ROM: WFL  · LLE Strength: WFL    Functional Mobility:  · Bed Mobility:     · Scooting: stand by assistance  · Supine to Sit: stand by assistance  · Transfers:     · Sit to Stand:  contact guard assistance with rolling walker x1 from bed with verbal cues for hand placement   · Gait:  Patient ambulated 100ft with Rolling Walker, and contact guard assistance, and right foot drop noted using 3-point gait. Patient demonstrated decreased gi, decreased step length and decreased toe-to-floor clearance during gait due to pain, decreased ROM and decreased strength. Patient able to clear right toes throughout ambulation. No LOB or SOB noted. Verbal cues provided for RW management and technique.     Therapeutic Activities and Exercises:  Patient educated in:  -PT role and POC  -safety with transfers including hand placement  -gait sequencing and RW management  -OOB activity to maximize recovery including ambulating with nursing staff  assistance and RW      AM-PAC 6 CLICK MOBILITY  Total Score:17     Patient left up in chair with right knee flexed on 2 pillows, all lines intact, call button in reach, RN, anesthesia, PA, and resident notified that no DF present to right foot, and mother present.     GOALS:   Multidisciplinary Problems     Physical Therapy Goals        Problem: Physical Therapy Goal    Goal Priority Disciplines Outcome Goal Variances Interventions   Physical Therapy Goal     PT, PT/OT Ongoing, Progressing     Description: Goals to be met by: 2020    Patient will increase functional independence with mobility by performin. Supine to sit with supervision  2. Sit to stand transfer with Supervision  3. Gait x400 feet with Supervision using Rolling Walker  4. Ascend/Descend 6 inch curb step with Stand-by Assistance using Rolling Walker  5. Lower extremity exercise program x30 reps per handout, with supervision                     History:     Past Medical History:   Diagnosis Date    Chronic pain     Disorder of kidney and ureter     Erectile dysfunction due to diseases classified elsewhere     HIV (human immunodeficiency virus infection)     Pneumonia 2012    Primary localized osteoarthrosis of the knee, right        Past Surgical History:   Procedure Laterality Date    HERNIA REPAIR      Left groin       Time Tracking:     PT Received On: 20  PT Start Time: 1430     PT Stop Time: 1455  PT Total Time (min): 25 min     Billable Minutes: Evaluation 10 and Gait Training 15    Samreen Badillo, PT  2020

## 2020-08-12 NOTE — ANESTHESIA POSTPROCEDURE EVALUATION
Anesthesia Post Evaluation    Patient: Carlos Vee    Procedure(s) Performed: Procedure(s) (LRB):  ARTHROPLASTY, KNEE / [employer] [facility] (Right)    Final Anesthesia Type: spinal    Patient location during evaluation: PACU  Patient participation: Yes- Able to Participate  Level of consciousness: awake and alert  Post-procedure vital signs: reviewed and stable  Pain management: adequate  Airway patency: patent  ISMAEL mitigation strategies: Multimodal analgesia  PONV status at discharge: No PONV  Anesthetic complications: no      Cardiovascular status: blood pressure returned to baseline  Respiratory status: unassisted and spontaneous ventilation  Hydration status: euvolemic  Follow-up not needed.          Vitals Value Taken Time   /68 08/12/20 1436   Temp 36.7 °C (98 °F) 08/12/20 1410   Pulse 53 08/12/20 1436   Resp 16 08/12/20 1436   SpO2 97 % 08/12/20 1436         Event Time   Out of Recovery 14:12:00         Pain/Chance Score: Pain Rating Prior to Med Admin: 4 (8/12/2020  2:21 PM)  Chance Score: 10 (8/12/2020  1:47 PM)

## 2020-08-13 PROCEDURE — 99232 PR SUBSEQUENT HOSPITAL CARE,LEVL II: ICD-10-PCS | Mod: COE,,, | Performed by: NURSE PRACTITIONER

## 2020-08-13 PROCEDURE — 97535 SELF CARE MNGMENT TRAINING: CPT

## 2020-08-13 PROCEDURE — 99900035 HC TECH TIME PER 15 MIN (STAT)

## 2020-08-13 PROCEDURE — 97110 THERAPEUTIC EXERCISES: CPT

## 2020-08-13 PROCEDURE — 94761 N-INVAS EAR/PLS OXIMETRY MLT: CPT

## 2020-08-13 PROCEDURE — 11000001 HC ACUTE MED/SURG PRIVATE ROOM

## 2020-08-13 PROCEDURE — 25000003 PHARM REV CODE 250: Performed by: PHYSICIAN ASSISTANT

## 2020-08-13 PROCEDURE — 63600175 PHARM REV CODE 636 W HCPCS: Performed by: PHYSICIAN ASSISTANT

## 2020-08-13 PROCEDURE — 99232 SBSQ HOSP IP/OBS MODERATE 35: CPT | Mod: COE,,, | Performed by: NURSE PRACTITIONER

## 2020-08-13 PROCEDURE — 97116 GAIT TRAINING THERAPY: CPT

## 2020-08-13 RX ADMIN — CELECOXIB 200 MG: 200 CAPSULE ORAL at 09:08

## 2020-08-13 RX ADMIN — ACETAMINOPHEN 1000 MG: 500 TABLET ORAL at 12:08

## 2020-08-13 RX ADMIN — ASPIRIN 81 MG: 81 TABLET, COATED ORAL at 09:08

## 2020-08-13 RX ADMIN — DOCUSATE SODIUM 50MG AND SENNOSIDES 8.6MG 1 TABLET: 8.6; 5 TABLET, FILM COATED ORAL at 09:08

## 2020-08-13 RX ADMIN — POLYETHYLENE GLYCOL 3350 17 G: 17 POWDER, FOR SOLUTION ORAL at 09:08

## 2020-08-13 RX ADMIN — MUPIROCIN 1 G: 20 OINTMENT TOPICAL at 09:08

## 2020-08-13 RX ADMIN — PREGABALIN 75 MG: 75 CAPSULE ORAL at 09:08

## 2020-08-13 RX ADMIN — OXYCODONE 5 MG: 5 TABLET ORAL at 12:08

## 2020-08-13 RX ADMIN — ACETAMINOPHEN 1000 MG: 500 TABLET ORAL at 06:08

## 2020-08-13 RX ADMIN — CEFAZOLIN 2 G: 330 INJECTION, POWDER, FOR SOLUTION INTRAMUSCULAR; INTRAVENOUS at 02:08

## 2020-08-13 RX ADMIN — FAMOTIDINE 20 MG: 20 TABLET, FILM COATED ORAL at 09:08

## 2020-08-13 NOTE — PLAN OF CARE
Problem: Occupational Therapy Goal  Goal: Occupational Therapy Goal  Description: Goals to be met by: 8-14-20    Patient will increase functional independence with ADLs by performing:    UE Dressing with Modified [place].  LE Dressing with Supervision.  Grooming while standing at sink with Supervision.  Toileting from toilet with Supervision for hygiene and clothing management.   Toilet transfer to toilet with Supervision.    Outcome: Ongoing, Progressing    Patient completed toilet task at stand by assistance along with grooming task. He declined to complete dressing task during session today.     Ivis Casas OT  8/13/2020

## 2020-08-13 NOTE — PROGRESS NOTES
[facility]  Orthopedics  Progress Note    Patient Name: Carlos Vee  MRN: [id number]  Admission Date: 8/12/2020  Hospital Length of Stay: 1 days  Attending Provider: Bhavik Alex III, MD  Primary Care Provider: Darvin Reed (Inactive)  Follow-up For: Procedure(s) (LRB):  ARTHROPLASTY, KNEE / [facility] [facility] (Right)    Post-Operative Day: 1 Day Post-Op  Subjective:     Principal Problem:Primary osteoarthritis of right knee    Principal Orthopedic Problem: same    Interval History: Patient seen and examined at bedside.  No acute events overnight.  Pain controlled.  Walked 100 feet with PT yesterday.  Adductor PNC pulled for foot drop yesterday.      Review of patient's allergies indicates:  No Known Allergies    Current Facility-Administered Medications   Medication    0.9%  NaCl infusion    acetaminophen tablet 1,000 mg    aspirin EC tablet 81 mg    bisacodyL suppository 10 mg    celecoxib capsule 200 mg    dolutegravir-lamivudine  mg Tab 1 tablet    famotidine tablet 20 mg    morphine injection 2 mg    mupirocin 2 % ointment 1 g    naloxone 0.4 mg/mL injection 0.02 mg    ondansetron injection 4 mg    oxyCODONE immediate release tablet 10 mg    oxyCODONE immediate release tablet 5 mg    polyethylene glycol packet 17 g    pregabalin capsule 75 mg    promethazine (PHENERGAN) 6.25 mg in dextrose 5 % 50 mL IVPB    ropivacaine (PF) 2 mg/ml (0.2%) infusion    ropivacaine 0.2% ON-Q C-BLOC 400 ML (SELECT A FLOW)    senna-docusate 8.6-50 mg per tablet 1 tablet     Objective:     Vital Signs (Most Recent):  Temp: 97.8 °F (36.6 °C) (08/13/20 0414)  Pulse: (!) 57 (08/13/20 0414)  Resp: 16 (08/13/20 0414)  BP: 104/64 (08/13/20 0414)  SpO2: 97 % (08/13/20 0414) Vital Signs (24h Range):  Temp:  [97.3 °F (36.3 °C)-98.3 °F (36.8 °C)] 97.8 °F (36.6 °C)  Pulse:  [51-61] 57  Resp:  [11-18] 16  SpO2:  [95 %-100 %] 97 %  BP: ()/(56-73) 104/64     Weight: 83.9 kg (185  "lb)  Height: 6' 1" (185.4 cm)  Body mass index is 24.41 kg/m².      Intake/Output Summary (Last 24 hours) at 8/13/2020 0615  Last data filed at 8/13/2020 0500  Gross per 24 hour   Intake 2240 ml   Output 1850 ml   Net 390 ml       Ortho/SPM Exam  NAD  No increased WOB  Dressing c/d/i  SILT T/Blount/Sa; SILT not intact DP/SP  Motor intact T; 0/5 motor function DP/SP  WWP extremities    Significant Labs: All pertinent labs within the past 24 hours have been reviewed.    Significant Imaging: I have reviewed all pertinent imaging results/findings.    Assessment/Plan:     * Primary osteoarthritis of right knee s/p TKA 8/12/20  60 y.o. male s/p R TKA 8/12/20  Foot drop - knee flexed    VS:  Stable   Nerve block:  Adductor PNC, periop spinal  PT/OT:  WBAT  DVT PPx:  ASA 81 BID, FCDs  Cultures:  none  Abx:  Postop Ancef  Labs:  none  Drain:  none  Shi:  none    Dispo: f/u PT recs; OP PT scheduled 8/14/20; will discuss foot drop with staff      Asymptomatic varicose veins of both lower extremities  TEDs    Osteopenia  Stable     HIV (human immunodeficiency virus infection)  Home meds          Thien Carroll MD  Orthopedics  [facility]  "

## 2020-08-13 NOTE — PT/OT/SLP PROGRESS
Physical Therapy Treatment    Patient Name:  Carlos Vee   MRN:  [id number]    Recommendations:     Discharge Recommendations:  outpatient PT   Discharge Equipment Recommendations: bedside commode, walker, rolling   Barriers to discharge: patient going to stay at the [facility] prior to flying back home    Assessment:     Carlos Vee is a 60 y.o. male admitted with a medical diagnosis of Primary osteoarthritis of right knee.  He presents with the following impairments/functional limitations:  weakness, impaired functional mobilty, gait instability, impaired balance, decreased lower extremity function, pain, decreased ROM, impaired skin, impaired joint extensibility, orthopedic precautions. Patient tolerated PT session fairly well. Patient ambulated 150ft with AFO on right foot, RW and stand by assistance. No LOB or SOB noted. Patient performed R LE therex 2x10 reps.     Rehab Prognosis: Good; patient would benefit from acute skilled PT services to address these deficits and reach maximum level of function.    Recent Surgery: Procedure(s) (LRB):  ARTHROPLASTY, KNEE / [facility] [facility] (Right) 1 Day Post-Op    Plan:     During this hospitalization, patient to be seen daily to address the identified rehab impairments via gait training, therapeutic activities, therapeutic exercises, neuromuscular re-education and progress toward the following goals:    · Plan of Care Expires:  08/14/20    Subjective     Chief Complaint: Leg still feels numb and heavy. Clicking in his knee occasionally when walking.   Patient/Family Comments/goals: To get back into the gym.   Pain/Comfort:  · Pain Rating 1: 1/10  · Location - Side 1: Right  · Location - Orientation 1: generalized  · Location 1: knee  · Pain Addressed 1: Reposition, Distraction, Cessation of Activity, Nurse notified      Objective:     Communicated with RN prior to session.  Patient found supine with cryotherapy, FCD upon PT entry to room.     General Precautions:  Standard, fall   Orthopedic Precautions:RLE weight bearing as tolerated   Braces:   n/a    Functional Mobility:  · Bed Mobility:     · Supine to Sit: supervision  · Sit to Supine: supervision  · Transfers:     · Sit to Stand:  supervision with rolling walker x1 from bed with verbal cues for hand placement   · Gait:  Patient ambulated 150ft with R AFO in slipper, Rolling Walker and stand by assistance using swing-through gait. Patient demonstrated decreased gi and decreased step length during gait due to pain, decreased ROM and decreased strength. Patient educated in knee extension during stance phase. Patient with complaint of occasional clicking in knee during swing phase. Patient's mother educated to bring patient's tennis shoe tomorrow when she comes because AFO will have a better fit.       AM-PAC 6 CLICK MOBILITY  Turning over in bed (including adjusting bedclothes, sheets and blankets)?: 4  Sitting down on and standing up from a chair with arms (e.g., wheelchair, bedside commode, etc.): 4  Moving from lying on back to sitting on the side of the bed?: 4  Moving to and from a bed to a chair (including a wheelchair)?: 4  Need to walk in hospital room?: 4  Climbing 3-5 steps with a railing?: 3  Basic Mobility Total Score: 23       Therapeutic Activities and Exercises:  Patient educated in and performed R LE exercises 2x10 for quad set, glute set, SAQ over bolster, heel slides, hip abd/add, SLR, and LAQ. Patient demonstrated 1/5 strength for DF with knee in flexed position.     Patient educated in:  -PT role and POC  -safety with transfers including hand placement  -gait sequencing and RW management  -OOB activity to maximize recovery including ambulating at home to prevent DVT   -HEP for therex at home with handout provided       Patient left supine with knee flexed on 2 pillows, all lines intact, call button in reach, RN notified and mother present.    GOALS:   Multidisciplinary Problems     Physical Therapy  Goals        Problem: Physical Therapy Goal    Goal Priority Disciplines Outcome Goal Variances Interventions   Physical Therapy Goal     PT, PT/OT Ongoing, Progressing     Description: Goals to be met by: 2020    Patient will increase functional independence with mobility by performin. Supine to sit with supervision  2. Sit to stand transfer with Supervision  3. Gait x400 feet with Supervision using Rolling Walker  4. Ascend/Descend 6 inch curb step with Stand-by Assistance using Rolling Walker  5. Lower extremity exercise program x30 reps per handout, with supervision                     Time Tracking:     PT Received On: 20  PT Start Time: 1131     PT Stop Time: 1201  PT Total Time (min): 30 min     Billable Minutes: Gait Training  15 and Therapeutic Exercise 15    Treatment Type: Treatment  PT/PTA: PT       Samreen Badillo, PT  2020

## 2020-08-13 NOTE — PT/OT/SLP PROGRESS
Occupational Therapy   Treatment    Name: Carlos Vee  MRN: [id number]  Admitting Diagnosis:  Primary osteoarthritis of right knee  1 Day Post-Op    Recommendations:     Discharge Recommendations: home  Discharge Equipment Recommendations:  walker, rolling, bedside commode  Barriers to discharge:       Assessment:     Carlos Vee is a 60 y.o. male with a medical diagnosis of Primary osteoarthritis of right knee. Patient is s/p right TKA. Patient declined to complete dressing task during session today to increase independence with using urinal while in bed. He completed toilet/grooming task at stand by assistance.  Performance deficits affecting function are weakness, impaired self care skills, impaired functional mobilty, gait instability, impaired balance, decreased lower extremity function, decreased ROM, orthopedic precautions.     Rehab Prognosis:  Good; patient would benefit from acute skilled OT services to address these deficits and reach maximum level of function.       Plan:     Patient to be seen daily to address the above listed problems via self-care/home management, therapeutic activities, therapeutic exercises  · Plan of Care Expires: 08/14/20  · Plan of Care Reviewed with: patient, mother    Subjective     Patient reported he was excited to get up.   Pain/Comfort:  · Pain Rating 1: 0/10    Objective:     Communicated with: RN prior to session.  Patient found supine with cryotherapy, FCD upon OT entry to room.    General Precautions: Standard, fall   Orthopedic Precautions:RLE weight bearing as tolerated   Braces: N/A     Occupational Performance:     Bed Mobility:    · Patient completed Scooting/Bridging with supervision  · Patient completed Supine to Sit with supervision     Functional Mobility/Transfers:  · Patient completed Sit <> Stand Transfer with stand by assistance  with  rolling walker   · Patient completed Toilet Transfer Step Transfer technique with stand by assistance with   rolling walker  · Functional Mobility: patient ambulated to/from bathroom with use of rolling walker at supervision     Activities of Daily Living:  · Grooming: stand by assistance standing at sink to brush teeth, wash face  · Toileting: stand by assistance completed in standing with rolling walker rolled over toilet to urinate      [facility] 6 Click ADL: 19    Treatment & Education:  -Patient educated on hand placement for transfers   -Patient educated on rolling walker placement for ADLs  -Educated patient's  Mother to bring proper footwear (tennis) for use with AFO  -Patient educated on role OT and plan of care s/p surgery at [facility], white board updated as needed     Patient left supine with all lines intact, call button in reach, RN notified and mother presentEducation:      GOALS:   Multidisciplinary Problems     Occupational Therapy Goals        Problem: Occupational Therapy Goal    Goal Priority Disciplines Outcome Interventions   Occupational Therapy Goal     OT, PT/OT Ongoing, Progressing    Description: Goals to be met by: 8-14-20    Patient will increase functional independence with ADLs by performing:    UE Dressing with Modified [place].  LE Dressing with Supervision.  Grooming while standing at sink with Supervision.  Toileting from toilet with Supervision for hygiene and clothing management.   Toilet transfer to toilet with Supervision.                     Time Tracking:     OT Date of Treatment: 08/13/20  OT Start Time: 1026  OT Stop Time: 1049  OT Total Time (min): 23 min    Billable Minutes:Self Care/Home Management 23    Ivis Casas OT  8/13/2020

## 2020-08-13 NOTE — PROGRESS NOTES
Acute Pain Service and Perioperative Surgical Home Progress Note    HPI  Carlos Vee is a 60 y.o., male, with PMH of   Past Medical History:   Diagnosis Date    Chronic pain     Disorder of kidney and ureter     Erectile dysfunction due to diseases classified elsewhere     HIV (human immunodeficiency virus infection)     Pneumonia 09/2012    Primary localized osteoarthrosis of the knee, right    presents ambulatory with complaint of inability to dorsiflex foot which began yesterday post-operatively. Pt's adductor catheter pulled around & pm last night per recommendations of Brenda Alex and Taco due to patient's inability to dorsiflex despite holding catheter infusion for several hrs. Pt reports ambulating well without PT yesterday. Pt ate dinner last night without incident.      Interval history      Surgery:  Procedure(s) (LRB):  ARTHROPLASTY, KNEE / [facility] [facility] (Right)    Post Op Day #: 1    Catheter type: Perineural Adductor Canal    Infusion type: Ropivacaine 0.2%  8 ml/hr basal    Problem List:    Active Hospital Problems    Diagnosis  POA    *Primary osteoarthritis of right knee s/p TKA 8/12/20 [M17.11]  Yes    Asymptomatic varicose veins of both lower extremities [I83.93]  Yes    Osteopenia [M85.80]  Yes    HIV (human immunodeficiency virus infection) [B20]  Yes      Resolved Hospital Problems   No resolved problems to display.       Subjective:       General appearance of alert, oriented, no complaints   Pain with rest: 1    Numbers   Pain with movement: 2    Numbers   Side Effects    1. Pruritis No    2. Nausea No    3. Motor Blockade No, 0=Ability to raise lower extremities off bed    4. Sedation No, S=sleep, easy to arouse    Schedule Medications:    acetaminophen  1,000 mg Oral Q6H    aspirin  81 mg Oral BID    celecoxib  200 mg Oral Daily    dolutegravir-lamivudine  1 tablet Oral QHS    famotidine  20 mg Oral BID    mupirocin  1 g Nasal BID    polyethylene glycol  17 g Oral Daily     pregabalin  75 mg Oral QHS    senna-docusate 8.6-50 mg  1 tablet Oral BID        Continuous Infusions:   sodium chloride 0.9% 150 mL/hr at 08/12/20 2035    ropivacaine (PF) 2 mg/ml (0.2%) Stopped (08/12/20 1612)    ropivacaine          PRN Medications:  bisacodyL, morphine, naloxone, ondansetron, oxyCODONE, oxyCODONE, promethazine (PHENERGAN) IVPB, ropivacaine       Antibiotics:  Antibiotics (From admission, onward)    Start     Stop Route Frequency Ordered    08/12/20 2100  mupirocin 2 % ointment 1 g      08/17 2059 Nasl 2 times daily 08/12/20 1446             Objective:     Catheter site clean, dry, intact          Vital Signs (Most Recent):  Temp: 36.6 °C (97.8 °F) (08/13/20 0414)  Pulse: (!) 57 (08/13/20 0414)  Resp: 16 (08/13/20 0414)  BP: 104/64 (08/13/20 0414)  SpO2: 97 % (08/13/20 0414) Vital Signs Range (Last 24H):  Temp:  [36.3 °C (97.3 °F)-36.8 °C (98.3 °F)]   Pulse:  [51-61]   Resp:  [11-18]   BP: ()/(56-73)   SpO2:  [95 %-100 %]          I & O (Last 24H):    Intake/Output Summary (Last 24 hours) at 8/13/2020 0550  Last data filed at 8/13/2020 0500  Gross per 24 hour   Intake 2240 ml   Output 1850 ml   Net 390 ml       Physical Exam:    GA: Alert, comfortable, no acute distress.   Pulmonary: Clear to auscultation A/P/L. No wheezing, crackles, or rhonchi.  Cardiac: RRR S1 & S2 w/o rubs/murmurs/gallops.   Abdominal:Bowel sounds present. No tenderness to palpation or distension. No appreciable hepatosplenomegaly.   Skin: No jaundice, rashes, or visible lesions.         Laboratory:  CBC: No results for input(s): WBC, RBC, HGB, HCT, PLT, MCV, MCH, MCHC in the last 72 hours.    BMP:   Recent Labs     08/11/20  1302      K 4.6   CO2 27      BUN 22   CREATININE 1.1   GLU 89   CALCIUM 8.7       No results for input(s): PT, INR, PROTIME, APTT in the last 72 hours.      Anticoagulant dose ASA at 81 mg.    Assessment:         Pain control adequate    Plan:     1) Pain:    Adductor canal  perineural catheter in place and infusing. Good level. Multimodal pain regimen with acetaminophen, Celebrex, Lyrica, and prn oxycodone given  Will continue to monitor. Plan to discharge with On-Q on POD #1.   2) FEN/GI: Tolerating liquids, advance diet as tolerated. No flatus. No BM.   3) Dispo: Pt working well with PT/OT. Case management and SW for placement. Plan for discharge POD #1.        Evaluator Toussaint Battley III, FNP

## 2020-08-13 NOTE — NURSING
Notified by PT that patient unable to dorsiflex foot and right foot drop noted during ambulation. Samreen with PT notified PA, anesthesia, and resident. No new orders received at this time.

## 2020-08-13 NOTE — NURSING
Per patient, took own aspirin and stool softner from meds delivered to bedside. Patient educated on importance of not taking medications for home use and that we would administer medications needed here. Patient verbalizes understanding.

## 2020-08-13 NOTE — PLAN OF CARE
Patient A/Ox4. Patient in bed resting at this time. Bed locked and in the lowest position. Patient instructed to call for assistance. Call light in reach. Q2 hour rounds made. Noted 18 g to right wrist infusing NS at 150, site CDI. Polar Ice in place, place on the right knee. Ropivacaine was discontinued. Dressing to the right knee is CDI.  Walker and commode at the bedside. Patient up to the restroom as tolerated. Pain managed with prn pain medication. Safety maintained, no fall noted. Will continue to observe and follow the current plan of care.

## 2020-08-13 NOTE — PROGRESS NOTES
Acute Pain Service and Perioperative Surgical Home Progress Note    HPI  Carlos Vee is a 60 y.o., male, with HIV and PTSD POD#2 from a right TKA. Pt initially had foot drop after surgery which has since resolved. ELVIRA.     Interval history      Surgery:  Procedure(s) (LRB):  ARTHROPLASTY, KNEE / [facility] [facility] (Right)    Post Op Day #: 2    Catheter type: none    Infusion type: none    Problem List:    Active Hospital Problems    Diagnosis  POA    *Status post total right knee replacement 8/12/2020 [Z96.651]  Not Applicable    Primary osteoarthritis of right knee s/p TKA 8/12/20 [M17.11]  Yes    Asymptomatic varicose veins of both lower extremities [I83.93]  Yes    Osteopenia [M85.80]  Yes    HIV (human immunodeficiency virus infection) [B20]  Yes      Resolved Hospital Problems   No resolved problems to display.       Subjective:       General appearance of alert, oriented, no complaints   Pain with rest: 0   Pain with movement: 0   Side Effects    1. Pruritis No    2. Nausea No    3. Motor Blockade No, 0=Ability to raise lower extremities off bed    4. Sedation No, 1=awake and alert    Schedule Medications:    acetaminophen  1,000 mg Oral Q6H    aspirin  81 mg Oral BID    celecoxib  200 mg Oral Daily    dolutegravir-lamivudine  1 tablet Oral QHS    famotidine  20 mg Oral BID    mupirocin  1 g Nasal BID    polyethylene glycol  17 g Oral Daily    pregabalin  75 mg Oral QHS    senna-docusate 8.6-50 mg  1 tablet Oral BID    tuberculin  5 Units Intradermal Once        Continuous Infusions:       PRN Medications:  bisacodyL, morphine, naloxone, ondansetron, oxyCODONE, oxyCODONE, promethazine (PHENERGAN) IVPB       Antibiotics:  Antibiotics (From admission, onward)    Start     Stop Route Frequency Ordered    08/12/20 2100  mupirocin 2 % ointment 1 g      08/17 2059 Nasl 2 times daily 08/12/20 1446             Objective:     Catheter site: n/a          Vital Signs (Most Recent):  Temp: 97.8 °F (36.6  °C) (08/14/20 0405)  Pulse: (!) 55 (08/14/20 0405)  Resp: 16 (08/14/20 0405)  BP: 108/63 (08/14/20 0405)  SpO2: 98 % (08/14/20 0512) Vital Signs Range (Last 24H):  Temp:  [97.5 °F (36.4 °C)-98.6 °F (37 °C)]   Pulse:  [55-67]   Resp:  [16-18]   BP: (102-121)/(55-75)   SpO2:  [93 %-99 %]          I & O (Last 24H):    Intake/Output Summary (Last 24 hours) at 8/14/2020 0541  Last data filed at 8/14/2020 0300  Gross per 24 hour   Intake 3240 ml   Output 2050 ml   Net 1190 ml       Physical Exam:    GA: Alert, comfortable, no acute distress.   Pulmonary: Clear to auscultation A/P/L. No wheezing, crackles, or rhonchi.  Cardiac: RRR S1 & S2 w/o rubs/murmurs/gallops.   Abdominal:Bowel sounds present. No tenderness to palpation or distension. No appreciable hepatosplenomegaly.   Skin: No jaundice, rashes, or visible lesions.         Laboratory:  CBC: No results for input(s): WBC, RBC, HGB, HCT, PLT, MCV, MCH, MCHC in the last 72 hours.    BMP:   Recent Labs     08/11/20  1302      K 4.6   CO2 27      BUN 22   CREATININE 1.1   GLU 89   CALCIUM 8.7       No results for input(s): PT, INR, PROTIME, APTT in the last 72 hours.      Anticoagulant dose Aspirin 81 mg at 2101.    Assessment:    Carlos Vee is a 60 y.o., male, with HIV and PTSD POD#2 from a right TKA. Pt initially had foot drop after surgery which has since resolved. NAEON.      Pain control adequate.    Plan:     1) Pain: Multimodal pain regimen with acetaminophen, Celebrex, Lyrica, and prn oxycodone given  Will continue to monitor.    2) FEN/GI: Tolerating regular diet.   3) Dispo: Pt working well with PT/OT. Case management and SW for placement. Plan for discharge POD #2.        Evaluator PHILOMENA Mirza

## 2020-08-13 NOTE — PLAN OF CARE
Pt is AAOx4. VSS. No falls/injury as pt calls for assistance when needed. Fall precautions remain in place. No s/s of skin breakdown as pt is independent with re-positioning self. Pain being monitored and controlled with PRN and scheduled meds. PNC intact and infusing. Pt remained in bed with knee flexed per Dr. Alex's order. Bed in lowest position. Call light within reach. Will continue to monitor.

## 2020-08-13 NOTE — SUBJECTIVE & OBJECTIVE
"Principal Problem:Primary osteoarthritis of right knee    Principal Orthopedic Problem: same    Interval History: Patient seen and examined at bedside.  No acute events overnight.  Pain controlled.  Walked 100 feet with PT yesterday.  Adductor PNC pulled for foot drop yesterday.      Review of patient's allergies indicates:  No Known Allergies    Current Facility-Administered Medications   Medication    0.9%  NaCl infusion    acetaminophen tablet 1,000 mg    aspirin EC tablet 81 mg    bisacodyL suppository 10 mg    celecoxib capsule 200 mg    dolutegravir-lamivudine  mg Tab 1 tablet    famotidine tablet 20 mg    morphine injection 2 mg    mupirocin 2 % ointment 1 g    naloxone 0.4 mg/mL injection 0.02 mg    ondansetron injection 4 mg    oxyCODONE immediate release tablet 10 mg    oxyCODONE immediate release tablet 5 mg    polyethylene glycol packet 17 g    pregabalin capsule 75 mg    promethazine (PHENERGAN) 6.25 mg in dextrose 5 % 50 mL IVPB    ropivacaine (PF) 2 mg/ml (0.2%) infusion    ropivacaine 0.2% ON-Q C-BLOC 400 ML (SELECT A FLOW)    senna-docusate 8.6-50 mg per tablet 1 tablet     Objective:     Vital Signs (Most Recent):  Temp: 97.8 °F (36.6 °C) (08/13/20 0414)  Pulse: (!) 57 (08/13/20 0414)  Resp: 16 (08/13/20 0414)  BP: 104/64 (08/13/20 0414)  SpO2: 97 % (08/13/20 0414) Vital Signs (24h Range):  Temp:  [97.3 °F (36.3 °C)-98.3 °F (36.8 °C)] 97.8 °F (36.6 °C)  Pulse:  [51-61] 57  Resp:  [11-18] 16  SpO2:  [95 %-100 %] 97 %  BP: ()/(56-73) 104/64     Weight: 83.9 kg (185 lb)  Height: 6' 1" (185.4 cm)  Body mass index is 24.41 kg/m².      Intake/Output Summary (Last 24 hours) at 8/13/2020 0615  Last data filed at 8/13/2020 0500  Gross per 24 hour   Intake 2240 ml   Output 1850 ml   Net 390 ml       Ortho/SPM Exam  NAD  No increased WOB  Dressing c/d/i  SILT T/Blount/Sa; SILT not intact DP/SP  Motor intact T; 0/5 motor function DP/SP  WWP extremities    Significant Labs: All " pertinent labs within the past 24 hours have been reviewed.    Significant Imaging: I have reviewed all pertinent imaging results/findings.

## 2020-08-13 NOTE — PLAN OF CARE
Patient tolerated PT session fairly well. Patient ambulated 150ft with AFO on right foot, RW and stand by assistance. No LOB or SOB noted. Patient performed R LE therex 2x10 reps.       Problem: Physical Therapy Goal  Goal: Physical Therapy Goal  Description: Goals to be met by: 2020    Patient will increase functional independence with mobility by performin. Supine to sit with supervision  2. Sit to stand transfer with Supervision  3. Gait x400 feet with Supervision using Rolling Walker  4. Ascend/Descend 6 inch curb step with Stand-by Assistance using Rolling Walker  5. Lower extremity exercise program x30 reps per handout, with supervision    2020 1329 by Samreen Badillo, PT  Outcome: Ongoing, Progressing

## 2020-08-14 ENCOUNTER — PATIENT OUTREACH (OUTPATIENT)
Dept: ORTHOPEDICS | Facility: CLINIC | Age: 60
End: 2020-08-14

## 2020-08-14 ENCOUNTER — TELEPHONE (OUTPATIENT)
Dept: ORTHOPEDICS | Facility: CLINIC | Age: 60
End: 2020-08-14

## 2020-08-14 VITALS
RESPIRATION RATE: 16 BRPM | BODY MASS INDEX: 24.52 KG/M2 | SYSTOLIC BLOOD PRESSURE: 110 MMHG | WEIGHT: 185 LBS | OXYGEN SATURATION: 98 % | DIASTOLIC BLOOD PRESSURE: 67 MMHG | HEART RATE: 58 BPM | HEIGHT: 73 IN | TEMPERATURE: 98 F

## 2020-08-14 PROCEDURE — 99900035 HC TECH TIME PER 15 MIN (STAT)

## 2020-08-14 PROCEDURE — 97116 GAIT TRAINING THERAPY: CPT | Mod: CQ

## 2020-08-14 PROCEDURE — 25000003 PHARM REV CODE 250: Performed by: PHYSICIAN ASSISTANT

## 2020-08-14 PROCEDURE — 97110 THERAPEUTIC EXERCISES: CPT | Mod: CQ

## 2020-08-14 PROCEDURE — 99231 PR SUBSEQUENT HOSPITAL CARE,LEVL I: ICD-10-PCS | Mod: COE,,, | Performed by: NURSE PRACTITIONER

## 2020-08-14 PROCEDURE — 94761 N-INVAS EAR/PLS OXIMETRY MLT: CPT

## 2020-08-14 PROCEDURE — 86580 TB INTRADERMAL TEST: CPT | Performed by: ORTHOPAEDIC SURGERY

## 2020-08-14 PROCEDURE — 30200315 PPD INTRADERMAL TEST REV CODE 302: Performed by: ORTHOPAEDIC SURGERY

## 2020-08-14 PROCEDURE — 97535 SELF CARE MNGMENT TRAINING: CPT

## 2020-08-14 PROCEDURE — 97530 THERAPEUTIC ACTIVITIES: CPT | Mod: CQ

## 2020-08-14 PROCEDURE — 99231 SBSQ HOSP IP/OBS SF/LOW 25: CPT | Mod: COE,,, | Performed by: NURSE PRACTITIONER

## 2020-08-14 RX ADMIN — DOCUSATE SODIUM 50MG AND SENNOSIDES 8.6MG 1 TABLET: 8.6; 5 TABLET, FILM COATED ORAL at 07:08

## 2020-08-14 RX ADMIN — TUBERCULIN PURIFIED PROTEIN DERIVATIVE 5 UNITS: 5 INJECTION, SOLUTION INTRADERMAL at 07:08

## 2020-08-14 RX ADMIN — ACETAMINOPHEN 1000 MG: 500 TABLET ORAL at 05:08

## 2020-08-14 RX ADMIN — ACETAMINOPHEN 1000 MG: 500 TABLET ORAL at 12:08

## 2020-08-14 RX ADMIN — FAMOTIDINE 20 MG: 20 TABLET, FILM COATED ORAL at 07:08

## 2020-08-14 RX ADMIN — POLYETHYLENE GLYCOL 3350 17 G: 17 POWDER, FOR SOLUTION ORAL at 07:08

## 2020-08-14 RX ADMIN — CELECOXIB 200 MG: 200 CAPSULE ORAL at 07:08

## 2020-08-14 RX ADMIN — MUPIROCIN 1 G: 20 OINTMENT TOPICAL at 07:08

## 2020-08-14 RX ADMIN — ASPIRIN 81 MG: 81 TABLET, COATED ORAL at 07:08

## 2020-08-14 NOTE — ASSESSMENT & PLAN NOTE
60 y.o. male s/p R TKA 8/12/20  Foot drop - resolved    VS:  Stable   Nerve block:  Adductor PNC, periop spinal  PT/OT:  WBAT  DVT PPx:  ASA 81 BID, FCDs  Cultures:  none  Abx:  Postop Ancef complete  Labs:  none  Drain:  none  Shi:  none    Dispo: home today; OP PT scheduled 8/14/20

## 2020-08-14 NOTE — PLAN OF CARE
Problem: Occupational Therapy Goal  Goal: Occupational Therapy Goal  Description: Goals to be met by: 8-14-20    Patient will increase functional independence with ADLs by performing:    UE Dressing with Modified [place].  LE Dressing with Supervision.  Grooming while standing at sink with Supervision.  Toileting from toilet with Supervision for hygiene and clothing management.   Toilet transfer to toilet with Supervision.    Outcome: Met    OT goals met during session today. Patient will ability to dorsiflex right ankle. Completed lower body dressing at supervision.     Ivis Casas, OT  8/14/2020

## 2020-08-14 NOTE — PLAN OF CARE
Problem: Physical Therapy Goal  Goal: Physical Therapy Goal  Description: Goals to be met by: 2020    Patient will increase functional independence with mobility by performin. Supine to sit with supervision Met  2. Sit to stand transfer with Supervision Met  3. Gait x400 feet with Supervision using Rolling Walker  4. Ascend/Descend 6 inch curb step with Stand-by Assistance using Rolling Walker Met  5. Lower extremity exercise program x30 reps per handout, with supervision    Outcome: Ongoing, Progressing   Pedro Pablo Leon,PTA

## 2020-08-14 NOTE — TELEPHONE ENCOUNTER
3705 - [id number] visited patient in [facility], s/p R TKA, 8-12-20, caregiver at bedside, patient sitting up in bed, surgical leg elevated on pillows, Polar Ice machine in place, reports pain at 0.5/10, pulse 2+, dressing c/d/i, no s/s of infection, +BM x3, On Q Pump not present, d/c'd in hospital, medications assessed.      Patient was pleasant, no questions by him or caregiver.    Patient will call his insurance company to assess co pays at the PT facilities that he provided (x2).  He will then let writer know which one he will utilize.    Encouraged to ice, elevate, and complete home exercises.    Reiterated who to call over the weekend if he had a problem.  Patient verbalized understanding.

## 2020-08-14 NOTE — PROGRESS NOTES
"  Physical Therapy Treatment Note/Discharge summary     Name: Carlos Vee  Clinic Number: [id number]    Therapy Diagnosis:   Encounter Diagnoses   Name Primary?    Decreased ROM of right knee     Weakness of right lower extremity      Physician: Darvin Reed    Visit Date: 8/18/2020    Physician: Darvin Reed     Physician Orders: PT Eval and Treat   Medical Diagnosis from Referral:       M17.11 (ICD-10-CM) - Primary osteoarthritis of right knee         Evaluation Date: 8/17/2020  Authorization Period Expiration: 12/31/20  Plan of Care Expiration: 8/24/20  Visit # / Visits authorized: 2/ 2    Time In: 7:45am  Time Out: 8:30am  Total Billable Time: 45 minutes    Precautions: Standard, HIV positive       Subjective     Pt reports: "It's swollen today. It's a 7/10 when I'm walking on it, but otherwise, it's a 0/10."  He was compliant with home exercise program.  Response to previous treatment: Soreness  Functional change: Soreness    Pain: 7/10  Pain after therapy: 3/10  Location: right knee      Objective   R knee active range of motion (flexion while seated): 88/90  Extension (supine): -6 degrees    Carlos received therapeutic exercises to develop strength, endurance and ROM for 35 minutes including: performed 1 set for exercise and verbally reviewed stretches on handout for HEP.  supine  Quad set with towel under knee 3 x 10 with 2 sec hold  Glut set 3 x 10 with 2 sec hold  SLR 3 x 5 with min A to CGA  Hip abd supine 30 reps  Ankle pumps with GTB x 25  Ankle cirlces CW and CCW x 25  HS/gastroc stretch 3 x 20 secs with belt to assist    Seated clams with GTB 3 x 10 or supine  LAQs with RLE to assist with LLE 3 x 10  Seated foot slides into flexion/extension x10 reps     Carlos received the following manual therapy techniques: Myofacial release and Soft tissue Mobilization were applied to the: adductors, quadriceps and IT band (myofascial stretch) for 10 minutes.    Carlos received cold pack for 10 " minutes to R knee after therapy session (no charge).      Home Exercises Provided and Patient Education Provided     Education provided:   - Continue HEP at home  - start therapy at home    Written Home Exercises Provided: Patient instructed to cont prior HEP.  Exercises were reviewed and Carlos was able to demonstrate them prior to the end of the session.  Carlos demonstrated good  understanding of the education provided.     See EMR under Patient Instructions for exercises provided prior visit.    Assessment   Pt was able to meet 3 out of 4 goals listed below and reported less pain after the therapy session (combination of exercises, stretches and soft tissue massage/stretch). He will continue to benefit from therapy services upon returning home.  Carlos is progressing well towards his goals.   Pt prognosis is Good.     Pt will continue to benefit from skilled outpatient physical therapy to address the deficits listed in the problem list box on initial evaluation, provide pt/family education and to maximize pt's level of independence in the home and community environment.     Pt's spiritual, cultural and educational needs considered and pt agreeable to plan of care and goals.     Anticipated barriers to physical therapy: none    Goals:  1.  Pt to be independent with HEP for increased functional mobility and pain control. Met (8/18/2020)  2.  Pt to increase AROM at 95 degs for increased functional mobility and transfers. Not met  3.  Pt to decrease pain to less than 2/10 after session for increased functional mobility. Met (8/18/2020)  4.  Pt to ambulate 250' with RW and Mod I/S with initiation of knee flexion for swing phase and up. Met (8/18/2020)      Plan   Pt will discharge home in [place] and continue therapy services there.      Elizabeth Felix, PT

## 2020-08-14 NOTE — PT/OT/SLP PROGRESS
Physical Therapy Treatment    Patient Name:  Carlos Vee   MRN:  [id number]    Recommendations:     Discharge Recommendations:  outpatient PT   Discharge Equipment Recommendations: bedside commode, walker, rolling   Barriers to discharge: None    Assessment:     Carlos Vee is a 60 y.o. male admitted with a medical diagnosis of Status post total right knee replacement.  He presents with the following impairments/functional limitations:  weakness, decreased ROM, gait instability, decreased lower extremity function, impaired functional mobilty, edema, orthopedic precautions, impaired self care skills .Patient tolerated treatment well as evidence by increase distance with gait training. Patient had complete control of his right ankle and didn't require an AFO. He demonstrated good balance and R knee stability with OOB mobility using RW.  Mr. Vee will require family assistance upon discharge from hospital.    Rehab Prognosis: Good; patient would benefit from acute skilled PT services to address these deficits and reach maximum level of function.    Recent Surgery: Procedure(s) (LRB):  ARTHROPLASTY, KNEE / [facility] [facility] (Right) 2 Days Post-Op    Plan:     During this hospitalization, patient to be seen daily to address the identified rehab impairments via gait training, therapeutic activities, therapeutic exercises and progress toward the following goals:    · Plan of Care Expires:  08/14/20    Subjective     Chief Complaint: Right knee pain  Patient/Family Comments/goals: My Right ankle is fine.  Pain/Comfort:  · Pain Rating 1: 5/10  · Location - Side 1: Right  · Location 1: knee      Objective:     Communicated with NSG prior to session.  Patient found up in chair with FCD, cryotherapy upon PT entry to room.     General Precautions: Standard, fall   Orthopedic Precautions:RLE weight bearing as tolerated   Braces: AFO(Didn't require the use of an AFO)     Functional Mobility:  · Bed Mobility:      · Supine to Sit: independence  · Sit to Supine: independence  · Transfers:     · Sit to Stand:  supervision with rolling walker ( VC's for R LE placement)  · Gait: Amb 250 feet and 20 feet with RW SBA/Supervision. No LOB. Demonstrated good R LE stability, step through gait pattern and heel strike.  Noted min R hip circumduction during swing phase.  · Balance: Good balance with RW  · Stairs:  Ascend and descend 6inch curb step x 2 trials with RW CGA/SBA. VC's for sequence and technique      AM-PAC 6 CLICK MOBILITY  Turning over in bed (including adjusting bedclothes, sheets and blankets)?: 4  Sitting down on and standing up from a chair with arms (e.g., wheelchair, bedside commode, etc.): 4  Moving from lying on back to sitting on the side of the bed?: 4  Moving to and from a bed to a chair (including a wheelchair)?: 4  Need to walk in hospital room?: 4  Climbing 3-5 steps with a railing?: 4  Basic Mobility Total Score: 24       Therapeutic Activities and Exercises:   Performed established TKR exercises x15 reps AP,GS,QS,HS,SAQ,SLR,LAQ, and hip ABD/ADD  Patient given HEP  R ankle gross strength 4+/5  Patient educated on cryotherapy   Patient educated on car transfers      Patient left up in chair with all lines intact, call button in reach and NSG notified..    GOALS:   Multidisciplinary Problems     Physical Therapy Goals        Problem: Physical Therapy Goal    Goal Priority Disciplines Outcome Goal Variances Interventions   Physical Therapy Goal     PT, PT/OT Ongoing, Progressing     Description: Goals to be met by: 2020    Patient will increase functional independence with mobility by performin. Supine to sit with supervision  2. Sit to stand transfer with Supervision  3. Gait x400 feet with Supervision using Rolling Walker  4. Ascend/Descend 6 inch curb step with Stand-by Assistance using Rolling Walker  5. Lower extremity exercise program x30 reps per handout, with supervision                      Time Tracking:     PT Received On: 08/14/20  PT Start Time: 0845     PT Stop Time: 0927  PT Total Time (min): 42 min     Billable Minutes: Gait Training 15, Therapeutic Activity 12 and Therapeutic Exercise 15    Treatment Type: Treatment  PT/PTA: PTA     PTA Visit Number: 1     Pedro Pablo Leon II, PTA  08/14/2020

## 2020-08-14 NOTE — PLAN OF CARE
Patient A/Ox4. Patient in bed resting at this time. Bed locked and in the lowest position. Patient instructed to call for assistance. Call light in reach. Q2 hour rounds made. Noted 18 g to right wrist saline locked, site CDI. Polar Ice in place, place on the right knee.  Dressing to the right knee is CDI.  Dorsi/plantar flexion with much improved. Per Dr. Alex's order double pillow under knee to flex knee to 45 degrees. Walker and commode at the bedside. Patient up to the restroom as tolerated. Pain managed with prn pain medication. Safety measure educated to patient., no fall noted. Patient reported going to the bathroom alone. Nurse inform patient that he has to call for assistance to the bathroom.Will continue to observe and follow the current plan of care.

## 2020-08-14 NOTE — PROGRESS NOTES
Discharge Note: Pt discharged home alert and oriented x4, skin warm to touch , denies any numbness or tingling, + perpheral pulse. Pt verbalized understanding of discharge teaching

## 2020-08-14 NOTE — DISCHARGE SUMMARY
[facility]  Orthopedics  Discharge Summary      Patient Name: Carlos Vee  MRN: [id number]  Admission Date: 8/12/2020  Hospital Length of Stay: 2 days  Discharge Date and Time: 8/14/2020 11:10 AM  Attending Physician: Bhavik Alex MD  Discharging Provider: Thien Carroll MD  Primary Care Provider: Darvin Reed (Inactive)    HPI: Presented for R TKA.    Procedure(s) (LRB):  ARTHROPLASTY, KNEE / [facility] (Right)      Hospital Course: Foot drop postop that resolved POD1.  Adductor PNC removed POD0.  DC home POD2 after cleared by PT.  ASA 81 BID for DVT PPx.  WBAT.  F/u 2 weeks for wound check.      Consults (From admission, onward)        Status Ordering Provider     Inpatient consult to Pain Management  Once     Provider:  (Not yet assigned)    Acknowledged ROSA STEWARD     Inpatient consult to Respiratory Care  Once     Provider:  (Not yet assigned)    Acknowledged ROSA STEWARD     Inpatient consult to Social Work  Once     Provider:  (Not yet assigned)    Acknowledged ROSA STEWARD          Significant Diagnostic Studies: No pertinent studies.    Pending Diagnostic Studies:     Procedure Component Value Units Date/Time    Specimen to Pathology, Surgery Orthopedics [[id number]] Collected: 08/12/20 1257    Order Status: Sent Lab Status: In process Updated: 08/12/20 1322        Final Active Diagnoses:    Diagnosis Date Noted POA    PRINCIPAL PROBLEM:  Status post total right knee replacement 8/12/2020 [Z96.651] 08/11/2020 Not Applicable    Primary osteoarthritis of right knee s/p TKA 8/12/20 [M17.11] 08/12/2020 Yes    Asymptomatic varicose veins of both lower extremities [I83.93] 08/11/2020 Yes    Osteopenia [M85.80] 08/11/2020 Yes    HIV (human immunodeficiency virus infection) [B20] 06/02/2017 Yes      Problems Resolved During this Admission:      Discharged Condition: stable    Disposition: Home or Self Care    Follow Up:  Follow-up Information     Ivis  "[place], NP On 8/18/2020.    Specialty: Orthopedic Surgery  Contact information:  [address]  [facility] [address]  [phone]                 Patient Instructions:      COMMODE FOR HOME USE     Order Specific Question Answer Comments   Type: Standard    Height: 6' 1" (1.854 m)    Weight: 83.9 kg (185 lb)    Length of need (1-99 months): 1      BATH/SHOWER CHAIR FOR HOME USE     Order Specific Question Answer Comments   Height: 6' 1" (1.854 m)    Weight: 83.9 kg (185 lb)    Length of need (1-99 months): 99    Type: Without back      WALKER FOR HOME USE     Order Specific Question Answer Comments   Type of Walker: Adult (5'4"-6'6")    With wheels? Yes    Height: 6' 1" (1.854 m)    Weight: 83.9 kg (185 lb)    Length of need (1-99 months): 1    Please check all that apply: Walker will be used for gait training.      TRANSFER TUB BENCH FOR HOME USE     Order Specific Question Answer Comments   Type of Transfer Tub Bench: Unpadded    Height: 6' 1" (1.854 m)    Weight: 83.9 kg (185 lb)    Length of need (1-99 months): 1      Medications:  Reconciled Home Medications:      Medication List      CHANGE how you take these medications    aspirin 81 MG EC tablet  Commonly known as: ECOTRIN  Take 1 tablet (81 mg total) by mouth 2 (two) times daily.  What changed: additional instructions     celecoxib 200 MG capsule  Commonly known as: CeleBREX  Take 1 capsule (200 mg total) by mouth once daily.  What changed: additional instructions     oxyCODONE 5 MG immediate release tablet  Commonly known as: ROXICODONE  Take 1-2 tabs by mouth every 4-6 hours as needed for pain  What changed: additional instructions     STOOL SOFTENER 100 MG capsule  Generic drug: docusate sodium  Take 1 capsule (100 mg total) by mouth 2 (two) times daily as needed for Constipation.  What changed: additional instructions        CONTINUE taking these medications    DOVATO  mg Tab  Generic drug: dolutegravir-lamivudine  Take 1 tablet by mouth " every evening. Takes at night     FISH OIL ORAL  Take by mouth 2 (two) times daily. Hold for 1 week prior to surgery     MAPAP ARTHRITIS PAIN 650 MG Tbsr  Generic drug: acetaminophen  Take 1 tablet (650 mg total) by mouth every 8 (eight) hours as needed.     UNABLE TO FIND  Take 1 capsule by mouth once daily. medication name: nicocinomide riboside chloride   Hold for 1 week prior to surgery     VITAMIN B-12 ORAL  Take 1 tablet by mouth once daily. Hold for 1 week prior to surgery     vitamin D 1000 units Tab  Commonly known as: VITAMIN D3  Take 3,000 Units by mouth once daily. 3 capsules daily   Hold for 1 week prior to surgery        STOP taking these medications    alprostadiL 500 mcg/mL Soln 50 mcg, phentolamine 5 mg SolR 5 mg, papaverine 30 mg/mL Soln 30 mcg            Thien Carroll MD  Orthopedics  [facility]

## 2020-08-14 NOTE — PT/OT/SLP PROGRESS
Occupational Therapy   Treatment/Discharge    Name: Carlos Vee  MRN: [id number]  Admitting Diagnosis:  Status post total right knee replacement  2 Days Post-Op    Recommendations:     Discharge Recommendations: home  Discharge Equipment Recommendations:  walker, rolling, bedside commode  Barriers to discharge:       Assessment:     Carlos Vee is a 60 y.o. male with a medical diagnosis of Status post total right knee replacement.  Patient is s/p right TKA. He completed lower body dressing at supervision during session today. He has ability to dorsiflex right ankle. Patient is functioning near baseline level of function s/p right TKA for discharge. He is staying at [facility] x 1 week with mother to assist as needed. Performance deficits affecting function are weakness, impaired self care skills, impaired functional mobilty, gait instability, impaired balance, decreased lower extremity function, decreased ROM, orthopedic precautions.     Rehab Prognosis:  Good; patient would benefit from acute skilled OT services to address these deficits and reach maximum level of function.       Plan:     · DC from OT, staying at [facility] x 1 week     Subjective     My foot is better.     Pain/Comfort:  · Pain Rating 1: 0/10    Objective:     Communicated with: RN prior to session.  Patient found supine with cryotherapy, FCD upon OT entry to room.    General Precautions: Standard, fall   Orthopedic Precautions:RLE weight bearing as tolerated   Braces: N/A     Occupational Performance:     Bed Mobility:    · Patient completed Scooting/Bridging with modified independence  · Patient completed Supine to Sit with modified independence     Functional Mobility/Transfers:  · Patient completed Sit <> Stand Transfer with modified independence  with  rolling walker   · Patient completed Toilet Transfer Step Transfer technique with modified independence with  rolling walker   · Patient completed chair transfer with step transfer  technique with modified independence with rolling walker   · Functional Mobility: patient ambulated to/from bathroom with use of rolling walker at supervision     Activities of Daily Living:  · Grooming: modified independence standing at sink to wash hands, wash face  · Upper Body Dressing: modified independence sitting edge of bed to don overhead t-shirt  · Lower Body Dressing: supervision sitting edge of bed to don shorts    · Educated patient on completed transfer into walk in shower with ledge: Patient completed transfer into walk in shower at supervision level: practiced transferring into shower at request     [facility] 6 Click ADL: 22    Treatment & Education:  -Patient educated to dress surgical side first and further dressing techniques s/p surgery   -Patient educated on hand placement for transfers   -Patient educated on rolling walker placement for ADLs  -Patient educated on car transfers s/p surgery  -Patient educated on proper foot wear s/p surgery   -Patient educated on polar ice use  -Patient educated on role OT and plan of care s/p surgery at [facility], white board updated as needed     Patient left up in chair with all lines intact, call button in reach and RN notifiedEducation:      GOALS:   Multidisciplinary Problems     Occupational Therapy Goals     Not on file          Multidisciplinary Problems (Resolved)        Problem: Occupational Therapy Goal    Goal Priority Disciplines Outcome Interventions   Occupational Therapy Goal   (Resolved)     OT, PT/OT Met    Description: Goals to be met by: 8-14-20    Patient will increase functional independence with ADLs by performing:    UE Dressing with Modified [place].  LE Dressing with Supervision.  Grooming while standing at sink with Supervision.  Toileting from toilet with Supervision for hygiene and clothing management.   Toilet transfer to toilet with Supervision.                     Time Tracking:     OT Date of Treatment:  08/14/20  OT Start Time: 0708  OT Stop Time: 0732  OT Total Time (min): 24 min    Billable Minutes:Self Care/Home Management 24    Ivis Casas OT  8/14/2020

## 2020-08-14 NOTE — PROGRESS NOTES
"[facility]  Orthopedics  Progress Note    Patient Name: Carlos Vee  MRN: [id number]  Admission Date: 8/12/2020  Hospital Length of Stay: 2 days  Attending Provider: Bhavik Alex III, MD  Primary Care Provider: Darvin Reed (Inactive)  Follow-up For: Procedure(s) (LRB):  ARTHROPLASTY, KNEE / [facility] (Right)    Post-Operative Day: 2 Days Post-Op  Subjective:     Principal Problem:Status post total right knee replacement    Principal Orthopedic Problem: same    Interval History: Patient seen and examined at bedside.  No acute events overnight.  Pain controlled.  Walked 150 feet with PT yesterday.  Foot drop resolved.      Review of patient's allergies indicates:  No Known Allergies    Current Facility-Administered Medications   Medication    acetaminophen tablet 1,000 mg    aspirin EC tablet 81 mg    bisacodyL suppository 10 mg    celecoxib capsule 200 mg    dolutegravir-lamivudine  mg Tab 1 tablet    famotidine tablet 20 mg    morphine injection 2 mg    mupirocin 2 % ointment 1 g    naloxone 0.4 mg/mL injection 0.02 mg    ondansetron injection 4 mg    oxyCODONE immediate release tablet 10 mg    oxyCODONE immediate release tablet 5 mg    polyethylene glycol packet 17 g    pregabalin capsule 75 mg    promethazine (PHENERGAN) 6.25 mg in dextrose 5 % 50 mL IVPB    senna-docusate 8.6-50 mg per tablet 1 tablet    tuberculin injection 5 Units     Objective:     Vital Signs (Most Recent):  Temp: 97.8 °F (36.6 °C) (08/14/20 0405)  Pulse: (!) 55 (08/14/20 0405)  Resp: 16 (08/14/20 0405)  BP: 108/63 (08/14/20 0405)  SpO2: 98 % (08/14/20 0512) Vital Signs (24h Range):  Temp:  [97.5 °F (36.4 °C)-98.6 °F (37 °C)] 97.8 °F (36.6 °C)  Pulse:  [55-67] 55  Resp:  [16-18] 16  SpO2:  [93 %-99 %] 98 %  BP: (102-121)/(55-75) 108/63     Weight: 83.9 kg (185 lb)  Height: 6' 1" (185.4 cm)  Body mass index is 24.41 kg/m².      Intake/Output Summary (Last 24 hours) at 8/14/2020 " 0619  Last data filed at 8/14/2020 0300  Gross per 24 hour   Intake 3240 ml   Output 2050 ml   Net 1190 ml       Ortho/SPM Exam    NAD  No increased WOB  Dressing c/d/i  SILT T/[place]/Sa/DP/SP  Motor intact T/DP/SP  WWP extremities    Significant Labs: All pertinent labs within the past 24 hours have been reviewed.    Significant Imaging: I have reviewed all pertinent imaging results/findings.    Assessment/Plan:     Primary osteoarthritis of right knee s/p TKA 8/12/20  60 y.o. male s/p R TKA 8/12/20  Foot drop - resolved    VS:  Stable   Nerve block:  Adductor PNC, periop spinal  PT/OT:  WBAT  DVT PPx:  ASA 81 BID, FCDs  Cultures:  none  Abx:  Postop Ancef complete  Labs:  none  Drain:  none  Shi:  none    Dispo: home today; OP PT scheduled 8/14/20      Asymptomatic varicose veins of both lower extremities  TEDs    Osteopenia  Stable     HIV (human immunodeficiency virus infection)  Home meds          Thien Carroll MD  Orthopedics  [facility]

## 2020-08-15 LAB
BACTERIA SPEC AEROBE CULT: NO GROWTH

## 2020-08-16 ENCOUNTER — TELEPHONE (OUTPATIENT)
Dept: ORTHOPEDICS | Facility: CLINIC | Age: 60
End: 2020-08-16

## 2020-08-16 NOTE — TELEPHONE ENCOUNTER
Spoke with patient, he stated that the injection that Dr. Alex gave him in the hospital has worn off and he started taking Oxycodone every 5 hours, leg is swollen, skin is tight, he is doing light exercises.  Writer encouraged him to ice and elevate.  Instructed to call the resident on call if he had any concern.    Patient inquired of his JASON paperwork, he stated that he gave it to Monique, writer informed that if he gave it to her, then she would have brought it down to the Disability office located in the hospital. Writer provided him with Disability's phone number so that he can call and follow up on the status.      Writer will visit him in the [facility] on tomorrow morning.  Patient verbalized understanding.     *Writer received a message from Monique (Patient ) regarding patient status.

## 2020-08-17 ENCOUNTER — PATIENT OUTREACH (OUTPATIENT)
Dept: ORTHOPEDICS | Facility: CLINIC | Age: 60
End: 2020-08-17

## 2020-08-17 ENCOUNTER — TELEPHONE (OUTPATIENT)
Dept: ORTHOPEDICS | Facility: CLINIC | Age: 60
End: 2020-08-17

## 2020-08-17 ENCOUNTER — CLINICAL SUPPORT (OUTPATIENT)
Dept: REHABILITATION | Facility: HOSPITAL | Age: 60
End: 2020-08-17
Payer: COMMERCIAL

## 2020-08-17 DIAGNOSIS — R29.898 WEAKNESS OF RIGHT LOWER EXTREMITY: ICD-10-CM

## 2020-08-17 DIAGNOSIS — R89.9 ABNORMAL LABORATORY TEST RESULT: Primary | ICD-10-CM

## 2020-08-17 DIAGNOSIS — M25.661 DECREASED ROM OF RIGHT KNEE: ICD-10-CM

## 2020-08-17 PROCEDURE — 97161 PT EVAL LOW COMPLEX 20 MIN: CPT | Mod: PO

## 2020-08-17 PROCEDURE — 97110 THERAPEUTIC EXERCISES: CPT | Mod: PO

## 2020-08-17 NOTE — TELEPHONE ENCOUNTER
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 Alex would like him to see an ID physician, patient informed that the ID clinic called him and he has an appointment for 1100 tomorrow    Spoke to him regarding his caregiver Madeline Sood, informed that our physicians are unable to provide her with Percocet for her Fibromyalgia, writer inform that Pain Mgmt was consulted and they do not have any appointments available.  Writer suggested that his caregiver call to the hospital in his area at home and make an appointment now for later this week, as she will be staying with him for 2 weeks and then returning to [place].

## 2020-08-17 NOTE — PLAN OF CARE
[facility]  Physical Therapy Initial Evaluation    Date: 8/17/2020   Name: Carlos Vee  Clinic Number: [id number]    Therapy Diagnosis: Decreased R knee ROM and strength  Physician: Darvin Reed    Physician Orders: PT Eval and Treat   Medical Diagnosis from Referral:      M17.11 (ICD-10-CM) - Primary osteoarthritis of right knee       Evaluation Date: 8/17/2020  Authorization Period Expiration: 12/31/20  Plan of Care Expiration: 8/24/20  Visit # / Visits authorized: 1/ 1    Time In: 248 pm  Time Out: 330 pm  Total Appointment Time (timed & untimed codes): 42 minutes low complex eval, TE1    Precautions: Standard, HIV positive     Subjective   Date of onset: 8/12/20 s/p TKA  History of current condition - Carlos reports: original injury 8 years ago and progressive weakness and compensation since then and s/p R TKA in hospital 8/12/20 and was subsequently scheduled for eval but deferred for a few days for observation for s/p R TKA and will be seen today 5 days post-op for evaluation and to review HEP and mobility.  Pt initially with decreased motion and mobility in RLE after surgery which has started to improve since surgery.     Medical History:   Past Medical History:   Diagnosis Date    Chronic pain     Disorder of kidney and ureter     Erectile dysfunction due to diseases classified elsewhere     HIV (human immunodeficiency virus infection)     Pneumonia 09/2012    Primary localized osteoarthrosis of the knee, right        Surgical History:   Carlos Vee  has a past surgical history that includes Hernia repair (2003) and Knee Arthroplasty (Right, 8/12/2020).    Medications:   Carlos has a current medication list which includes the following prescription(s): acetaminophen, aspirin, celecoxib, cyanocobalamin (vitamin b-12), docosahexaenoic acid/epa, docusate sodium, dovato, oxycodone, UNABLE TO FIND, and vitamin d.    Allergies:   Review of patient's allergies  indicates:  No Known Allergies     Imaging, Xray: EXAMINATION:  XR KNEE 1 OR 2 VIEW RIGHT     CLINICAL HISTORY:  S/P TKA;  Unilateral primary osteoarthritis, right knee     FINDINGS:  Two views: The TKA in place good alignment no complication.        Electronically signed by: Art Ramos MD  Date:                                            08/12/2020  Time:                                           13:59    Prior Therapy: yes  Social History:  lives with their family no steps or stairs at home or work  Occupation:  on his feet of the day  Prior Level of Function: community level ambulation without AD   Current Level of Function: RW mod I limited community level.     Pain:  Current 2/10, worst 9/10, best 0/10   Location: right knee    Description: discomfort, sharp tight  Aggravating Factors: Sitting  Easing Factors: pain medication and ice    Pts goals: pt to return work without issues with gait or pain    Objective       Knee A/PROM: (measured in degrees) limited only by edema   Degrees Quality   Flexion   90/100    Extension -10/-8        Active/Passive Hip ROM: (measured in degrees)    RLE LLE   Flexion 100/ 120/   Abduction 45/ 50/   Extension 10/ 15/     Lower Extremity Strength (graded 0-5 out of 5)   RLE LLE   Hip flexion: 4-/5 4+/5   Knee extension: 3-/5 5/5   Ankle dorsiflexion: 3-/5 4+/5   Hip ABD 3-/5 4+/5   Knee flexion 3-/5 5/5   Ankle plantarflexion 3-/5 5/5   Hip extension: 3-/5 4+/5     Special Tests: ((+): pos.; (-): neg.) NT  · Fabers Test:   · Slump Test:   · SLR Test:     Mod I with gait and transfers with RW limited community ambulation 250+ to include S/SBA with steps 2 and 1 HR.      Gait with slow gi with minimal hip and knee flexion from toe off to swing phase and flat foot contact. Pt with decreased gi, step length and foot clearance but fairly good mechanics with mild ER due to 8 years of compensation since injury.    CMS Impairment/Limitation/Restriction  for FOTO knee Survey    Therapist reviewed FOTO scores for Carlos Vee on 8/17/2020.   FOTO documents entered into EPIC - see Media section.    Limitation Score: 55%  Category: Mobility    Current : CK = at least 40% but < 60% impaired, limited or restricted  Goal: CJ = at least 20% but < 40% impaired, limited or restricted  Discharge:            TREATMENT   Treatment Time In: 300 pm   Treatment Time Out: 320 pm  Total Treatment time (time-based codes) separate from Evaluation: 20 minutes    Carlos received therapeutic exercises to develop strength, endurance and ROM for 15 minutes including: performed 1 set for exercise and verbally reviewed stretches on handout for HEP.  supine  Quad set with towel under knee 3 x 10 with 2 sec hold  Glut set 3 x 10 with 2 sec hold  SLR 3 x 5 with min A to CGA  Hip abd supine with min A 3 x 10 or sitting with GTB  Ankle pumps with GTB x 25  Ankle cirlces CW and CCW x 25  SLR/HS stretch 3 x 20 secs with sheet or belt to assist  SKTC 3 x 20 secs with sheet or belt to assist      Seated clams with GTB 3 x 10 or supine  LAQs with RLE to assist with LLE 3 x 10  Foot on towel to reduce friction slide out into extension 3 x 10 and then slide into flexion as tolerated 1 x 10    gastroc stretch with belt 3 x 30 secs  Soleus stretch with belt 3 x 30 secs    Carlos received the following manual therapy techniques: Myofacial release and Soft tissue Mobilization were applied to the: adductors for 5 minutes, including:        Carlos received cold pack for 10  minutes to R knee while completing eval.    Home Exercises and Patient Education Provided    Education provided:   - HEP, pain management    Written Home Exercises Provided: Patient instructed to cont prior HEP.  Exercises were reviewed and Carlos was able to demonstrate them prior to the end of the session.  Carlos demonstrated good  understanding of the education provided.     See EMR under Patient Instructions as handout HEP for exercises  provided 8/17/2020.    Assessment   Carlos is a 60 y.o. male referred to outpatient Physical Therapy with a medical diagnosis of R primary OA of knee. Pt presents with s/p R TKA with R Knee decreased ROM and overall weakness and decreased fuctional mobility.  Pt with prolonged compensation with ER and use of ADDuctors to advance for the last 8 years and limping/antalgic gait without AD.  Pt surgery complicated by delay with 2 days with drop foot. Pt with overall good safety with mobility and transfers.     Pt prognosis is Good.   Pt will benefit from skilled outpatient Physical Therapy to address the deficits stated above and in the chart below, provide pt/family education, and to maximize pt's level of independence.     Plan of care discussed with patient: Yes  Pt's spiritual, cultural and educational needs considered and patient is agreeable to the plan of care and goals as stated below:     Anticipated Barriers for therapy: none    Medical Necessity is demonstrated by the following  History  Co-morbidities and personal factors that may impact the plan of care Co-morbidities:   HIV/AIDS and vascular diseases    Personal Factors:   age     low   Examination  Body Structures and Functions, activity limitations and participation restrictions that may impact the plan of care Body Regions:   lower extremities    Body Systems:    gross symmetry  ROM  strength  gross coordinated movement  gait    Participation Restrictions:   none    Activity limitations:   Learning and applying knowledge  no deficits    General Tasks and Commands  no deficits    Communication  No deficits    Mobility  lifting and carrying objects  walking    Self care  washing oneself (bathing, drying, washing hands)  eating    Domestic Life  shopping  cooking  doing house work (cleaning house, washing dishes, laundry)    Interactions/Relationships  no deficits    Life Areas  employment    Community and Social Life  recreation and leisure         moderate    Clinical Presentation stable and uncomplicated low   Decision Making/ Complexity Score: low     Goals:  1.  Pt to be independent with HEP for increased functional mobility and pain control.  2.  Pt to increase AROM at 95 degs for increased functional mobility and transfers.  3.  Pt to decrease pain to less than 2/10 after session for increased functional mobility.  4.  Pt to ambulate 250' with RW and Mod I/S with initiation of knee flexion for swing phase and up.     Plan   Plan of care Certification: 8/17/2020 to 8/24/20.    Outpatient Physical Therapy 3 times weekly for 1 weeks to include the following interventions: Gait Training, Manual Therapy, Moist Heat/ Ice, Patient Education, Therapeutic Activites and Therapeutic Exercise.     Dianne Mendez, PT

## 2020-08-17 NOTE — PROGRESS NOTES
[facility]  Physical Therapy Initial Evaluation    Date: 8/17/2020   Name: Carlos Vee  Clinic Number: [id number]    Therapy Diagnosis: Decreased R knee ROM and strength  Physician: Darvin Reed    Physician Orders: PT Eval and Treat   Medical Diagnosis from Referral:      M17.11 (ICD-10-CM) - Primary osteoarthritis of right knee       Evaluation Date: 8/17/2020  Authorization Period Expiration: 12/31/20  Plan of Care Expiration: 8/24/20  Visit # / Visits authorized: 1/ 1    Time In: 248 pm  Time Out: 330 pm  Total Appointment Time (timed & untimed codes): 42 minutes low complex eval, TE1    Precautions: Standard, HIV positive     Subjective   Date of onset: 8/12/20 s/p TKA  History of current condition - Carlos reports: original injury 8 years ago and progressive weakness and compensation since then and s/p R TKA in hospital 8/12/20 and was subsequently scheduled for eval but deferred for a few days for observation for s/p R TKA and will be seen today 5 days post-op for evaluation and to review HEP and mobility.  Pt initially with decreased motion and mobility in RLE after surgery which has started to improve since surgery.     Medical History:   Past Medical History:   Diagnosis Date    Chronic pain     Disorder of kidney and ureter     Erectile dysfunction due to diseases classified elsewhere     HIV (human immunodeficiency virus infection)     Pneumonia 09/2012    Primary localized osteoarthrosis of the knee, right        Surgical History:   Carlos Vee  has a past surgical history that includes Hernia repair (2003) and Knee Arthroplasty (Right, 8/12/2020).    Medications:   Carlos has a current medication list which includes the following prescription(s): acetaminophen, aspirin, celecoxib, cyanocobalamin (vitamin b-12), docosahexaenoic acid/epa, docusate sodium, dovato, oxycodone, UNABLE TO FIND, and vitamin d.    Allergies:   Review of patient's allergies  indicates:  No Known Allergies     Imaging, Xray: EXAMINATION:  XR KNEE 1 OR 2 VIEW RIGHT     CLINICAL HISTORY:  S/P TKA;  Unilateral primary osteoarthritis, right knee     FINDINGS:  Two views: The TKA in place good alignment no complication.        Electronically signed by: Art Ramos MD  Date:                                            08/12/2020  Time:                                           13:59    Prior Therapy: yes  Social History:  lives with their family no steps or stairs at home or work  Occupation:  on his feet of the day  Prior Level of Function: community level ambulation without AD   Current Level of Function: RW mod I limited community level.     Pain:  Current 2/10, worst 9/10, best 0/10   Location: right knee    Description: discomfort, sharp tight  Aggravating Factors: Sitting  Easing Factors: pain medication and ice    Pts goals: pt to return work without issues with gait or pain    Objective       Knee A/PROM: (measured in degrees) limited only by edema   Degrees Quality   Flexion   90/100    Extension -10/-8        Active/Passive Hip ROM: (measured in degrees)    RLE LLE   Flexion 100/ 120/   Abduction 45/ 50/   Extension 10/ 15/     Lower Extremity Strength (graded 0-5 out of 5)   RLE LLE   Hip flexion: 4-/5 4+/5   Knee extension: 3-/5 5/5   Ankle dorsiflexion: 3-/5 4+/5   Hip ABD 3-/5 4+/5   Knee flexion 3-/5 5/5   Ankle plantarflexion 3-/5 5/5   Hip extension: 3-/5 4+/5     Special Tests: ((+): pos.; (-): neg.) NT  · Fabers Test:   · Slump Test:   · SLR Test:     Mod I with gait and transfers with RW limited community ambulation 250+ to include S/SBA with steps 2 and 1 HR.      Gait with slow gi with minimal hip and knee flexion from toe off to swing phase and flat foot contact. Pt with decreased gi, step length and foot clearance but fairly good mechanics with mild ER due to 8 years of compensation since injury.    CMS Impairment/Limitation/Restriction  for FOTO knee Survey    Therapist reviewed FOTO scores for Carlos Vee on 8/17/2020.   FOTO documents entered into EPIC - see Media section.    Limitation Score: 55%  Category: Mobility    Current : CK = at least 40% but < 60% impaired, limited or restricted  Goal: CJ = at least 20% but < 40% impaired, limited or restricted  Discharge:            TREATMENT   Treatment Time In: 300 pm   Treatment Time Out: 320 pm  Total Treatment time (time-based codes) separate from Evaluation: 20 minutes    Carlos received therapeutic exercises to develop strength, endurance and ROM for 15 minutes including: performed 1 set for exercise and verbally reviewed stretches on handout for HEP.  supine  Quad set with towel under knee 3 x 10 with 2 sec hold  Glut set 3 x 10 with 2 sec hold  SLR 3 x 5 with min A to CGA  Hip abd supine with min A 3 x 10 or sitting with GTB  Ankle pumps with GTB x 25  Ankle cirlces CW and CCW x 25  SLR/HS stretch 3 x 20 secs with sheet or belt to assist  SKTC 3 x 20 secs with sheet or belt to assist      Seated clams with GTB 3 x 10 or supine  LAQs with RLE to assist with LLE 3 x 10  Foot on towel to reduce friction slide out into extension 3 x 10 and then slide into flexion as tolerated 1 x 10    gastroc stretch with belt 3 x 30 secs  Soleus stretch with belt 3 x 30 secs    Carlos received the following manual therapy techniques: Myofacial release and Soft tissue Mobilization were applied to the: adductors for 5 minutes, including:        Carlos received cold pack for 10  minutes to R knee while completing eval.    Home Exercises and Patient Education Provided    Education provided:   - HEP, pain management    Written Home Exercises Provided: Patient instructed to cont prior HEP.  Exercises were reviewed and Carlos was able to demonstrate them prior to the end of the session.  Carlos demonstrated good  understanding of the education provided.     See EMR under Patient Instructions as handout HEP for exercises  provided 8/17/2020.    Assessment   Carlos is a 60 y.o. male referred to outpatient Physical Therapy with a medical diagnosis of R primary OA of knee. Pt presents with s/p R TKA with R Knee decreased ROM and overall weakness and decreased fuctional mobility.  Pt with prolonged compensation with ER and use of ADDuctors to advance for the last 8 years and limping/antalgic gait without AD.  Pt surgery complicated by delay with 2 days with drop foot. Pt with overall good safety with mobility and transfers.     Pt prognosis is Good.   Pt will benefit from skilled outpatient Physical Therapy to address the deficits stated above and in the chart below, provide pt/family education, and to maximize pt's level of independence.     Plan of care discussed with patient: Yes  Pt's spiritual, cultural and educational needs considered and patient is agreeable to the plan of care and goals as stated below:     Anticipated Barriers for therapy: none    Medical Necessity is demonstrated by the following  History  Co-morbidities and personal factors that may impact the plan of care Co-morbidities:   HIV/AIDS and vascular diseases    Personal Factors:   age     low   Examination  Body Structures and Functions, activity limitations and participation restrictions that may impact the plan of care Body Regions:   lower extremities    Body Systems:    gross symmetry  ROM  strength  gross coordinated movement  gait    Participation Restrictions:   none    Activity limitations:   Learning and applying knowledge  no deficits    General Tasks and Commands  no deficits    Communication  No deficits    Mobility  lifting and carrying objects  walking    Self care  washing oneself (bathing, drying, washing hands)  eating    Domestic Life  shopping  cooking  doing house work (cleaning house, washing dishes, laundry)    Interactions/Relationships  no deficits    Life Areas  employment    Community and Social Life  recreation and leisure         moderate    Clinical Presentation stable and uncomplicated low   Decision Making/ Complexity Score: low     Goals:  1.  Pt to be independent with HEP for increased functional mobility and pain control.  2.  Pt to increase AROM at 95 degs for increased functional mobility and transfers.  3.  Pt to decrease pain to less than 2/10 after session for increased functional mobility.  4.  Pt to ambulate 250' with RW and Mod I/S with initiation of knee flexion for swing phase and up.     Plan   Plan of care Certification: 8/17/2020 to 8/24/20.    Outpatient Physical Therapy 3 times weekly for 1 weeks to include the following interventions: Gait Training, Manual Therapy, Moist Heat/ Ice, Patient Education, Therapeutic Activites and Therapeutic Exercise.     Dianne Mendez, PT

## 2020-08-18 ENCOUNTER — OFFICE VISIT (OUTPATIENT)
Dept: ORTHOPEDICS | Facility: CLINIC | Age: 60
End: 2020-08-18
Payer: COMMERCIAL

## 2020-08-18 ENCOUNTER — CLINICAL SUPPORT (OUTPATIENT)
Dept: REHABILITATION | Facility: HOSPITAL | Age: 60
End: 2020-08-18
Payer: COMMERCIAL

## 2020-08-18 ENCOUNTER — OFFICE VISIT (OUTPATIENT)
Dept: INFECTIOUS DISEASES | Facility: CLINIC | Age: 60
End: 2020-08-18
Payer: COMMERCIAL

## 2020-08-18 ENCOUNTER — LAB VISIT (OUTPATIENT)
Dept: LAB | Facility: HOSPITAL | Age: 60
End: 2020-08-18
Attending: INTERNAL MEDICINE
Payer: COMMERCIAL

## 2020-08-18 VITALS
TEMPERATURE: 98 F | SYSTOLIC BLOOD PRESSURE: 99 MMHG | BODY MASS INDEX: 26.03 KG/M2 | HEART RATE: 60 BPM | WEIGHT: 196.44 LBS | DIASTOLIC BLOOD PRESSURE: 66 MMHG | HEIGHT: 73 IN

## 2020-08-18 DIAGNOSIS — R29.898 WEAKNESS OF RIGHT LOWER EXTREMITY: ICD-10-CM

## 2020-08-18 DIAGNOSIS — R89.9 ABNORMAL LABORATORY TEST RESULT: ICD-10-CM

## 2020-08-18 DIAGNOSIS — M25.661 DECREASED ROM OF RIGHT KNEE: ICD-10-CM

## 2020-08-18 DIAGNOSIS — Z96.651 STATUS POST TOTAL RIGHT KNEE REPLACEMENT: ICD-10-CM

## 2020-08-18 DIAGNOSIS — M17.11 PRIMARY OSTEOARTHRITIS OF RIGHT KNEE: Primary | ICD-10-CM

## 2020-08-18 PROCEDURE — 99999 PR PBB SHADOW E&M-EST. PATIENT-LVL III: CPT | Mod: PBBFAC,COE,, | Performed by: NURSE PRACTITIONER

## 2020-08-18 PROCEDURE — 99203 PR OFFICE/OUTPT VISIT, NEW, LEVL III, 30-44 MIN: ICD-10-PCS | Mod: COE,S$GLB,, | Performed by: INTERNAL MEDICINE

## 2020-08-18 PROCEDURE — 86480 TB TEST CELL IMMUN MEASURE: CPT

## 2020-08-18 PROCEDURE — 99999 PR PBB SHADOW E&M-EST. PATIENT-LVL IV: ICD-10-PCS | Mod: PBBFAC,,, | Performed by: INTERNAL MEDICINE

## 2020-08-18 PROCEDURE — 99999 PR PBB SHADOW E&M-EST. PATIENT-LVL IV: CPT | Mod: PBBFAC,,, | Performed by: INTERNAL MEDICINE

## 2020-08-18 PROCEDURE — 36415 COLL VENOUS BLD VENIPUNCTURE: CPT

## 2020-08-18 PROCEDURE — 99024 POSTOP FOLLOW-UP VISIT: CPT | Mod: S$GLB,COE,, | Performed by: NURSE PRACTITIONER

## 2020-08-18 PROCEDURE — 99999 PR PBB SHADOW E&M-EST. PATIENT-LVL III: ICD-10-PCS | Mod: PBBFAC,COE,, | Performed by: NURSE PRACTITIONER

## 2020-08-18 PROCEDURE — 97140 MANUAL THERAPY 1/> REGIONS: CPT | Mod: PO

## 2020-08-18 PROCEDURE — 97110 THERAPEUTIC EXERCISES: CPT | Mod: PO

## 2020-08-18 PROCEDURE — 99024 PR POST-OP FOLLOW-UP VISIT: ICD-10-PCS | Mod: S$GLB,COE,, | Performed by: NURSE PRACTITIONER

## 2020-08-18 PROCEDURE — 99203 OFFICE O/P NEW LOW 30 MIN: CPT | Mod: COE,S$GLB,, | Performed by: INTERNAL MEDICINE

## 2020-08-18 RX ORDER — OXYCODONE HYDROCHLORIDE 5 MG/1
TABLET ORAL
Qty: 50 TABLET | Refills: 0 | Status: SHIPPED | OUTPATIENT
Start: 2020-08-18

## 2020-08-18 NOTE — PLAN OF CARE
[facility] patient.     08/13/20 1414   Discharge Assessment   Assessment Type Discharge Planning Assessment   Assessment information obtained from? Patient;Medical Record   Expected Length of Stay (days) 2   Prior to hospitilization cognitive status: Alert/Oriented   Prior to hospitalization functional status: Independent   Current cognitive status: Alert/Oriented   Current Functional Status: Assistive Equipment;Needs Assistance   Lives With alone   Is patient able to care for self after discharge? Unable to determine at this time (comments)   Who are your caregiver(s) and their phone number(s)? pt's mother present   Patient's perception of discharge disposition home or selfcare   Readmission Within the Last 30 Days no previous admission in last 30 days   Equipment Currently Used at Home none   Does the patient have transportation home? Yes  (pt's mother will assist pt)   Transportation Anticipated car, drives self;family or friend will provide   Discharge Plan A Home with family   Discharge Plan B Home with family;Home Health   DME Needed Upon Discharge  bedside commode;walker, rolling   Patient/Family in Agreement with Plan yes

## 2020-08-18 NOTE — PLAN OF CARE
Pt discharged to home. [facility] liaison w/ Ortho clinic will deliver a RW to pt at the [facility] and pt's BSC will be delivered to his home. Pt will have an outpt PT appt at the [facility] starting tmw.       08/14/20 1417   Final Note   Assessment Type Final Discharge Note   Anticipated Discharge Disposition Home   Hospital Follow Up  Appt(s) scheduled? Yes   Right Care Referral Info   Post Acute Recommendation No Care   Post-Acute Status   Post-Acute Authorization Other   Other Status No Post-Acute Service Needs

## 2020-08-18 NOTE — LETTER
August 18, 2020      Fatou Martinez PA-C  [address]  [facility] [address]           [facility] - Infectious Disease 1st Fl  [address]  [facility] [address]  Phone: [phone]  Fax: [phone]          Patient: Carlos Vee   MR Number: [id number]   YOB: 1960   Date of Visit: 8/18/2020       Dear Fatou Martinez:    Thank you for referring Carlos Vee to me for evaluation. Attached you will find relevant portions of my assessment and plan of care.    If you have questions, please do not hesitate to call me. I look forward to following Carlos Vee along with you.    Sincerely,    Slim Bond MD    Enclosure  CC:  No Recipients    If you would like to receive this communication electronically, please contact [email or web address] or [phone] to request more information on [employer] Link access.    For providers and/or their staff who would like to refer a patient to [facility], please contact us through our one-stop-shop provider referral line, [facility], at [phone].    If you feel you have received this communication in error or would no longer like to receive these types of communications, please e-mail [email or web address]

## 2020-08-18 NOTE — PROGRESS NOTES
Subjective:      Patient ID: Carlos Vee is a 60 y.o. male.    Chief Complaint:PPD Reading (Positive)      History of Present Illness  HPI     PPD Reading      Additional comments: Positive          Last edited by Simi Olivia LPN on 8/18/2020 10:44 AM. (History)      61 yo man with HIV on Dovato. Sees ID physician in [place] (Darvin Reed). Underwent TKA. Unusual cyst with drainage founds - special stains and cultures sent; path pending. PPD was placed which was reportedly positive. ID is consulted for possible TB. The patient denies TB exposure, cough, fever, or weight loss. As part of his HIV care, he believes he was previously screened for TB and was negative.    HIV Care  Diagnosed 2004  Acquired through sexual route - male   Follows safe sex  Under Infectious disease care in [place].  On Treatment since 2004   Last CD4  Count about 340 - about 3 months ago   Viral load undetectable   No other STDs.    Review of Systems   Constitution: Negative for fever, malaise/fatigue and weight loss.   Respiratory: Negative for cough and hemoptysis.    All other systems reviewed and are negative.    Objective:   Physical Exam  Vitals signs and nursing note reviewed.   Constitutional:       Appearance: Normal appearance. He is normal weight.   HENT:      Head: Normocephalic and atraumatic.   Pulmonary:      Effort: No respiratory distress.      Breath sounds: Normal breath sounds.   Musculoskeletal:         General: No swelling or tenderness.   Skin:     General: Skin is warm and dry.      Findings: No erythema or rash.   Neurological:      General: No focal deficit present.      Mental Status: He is alert and oriented to person, place, and time. Mental status is at baseline.   Psychiatric:         Mood and Affect: Mood normal.         Behavior: Behavior normal.         Thought Content: Thought content normal.         Judgment: Judgment normal.       Assessment/Plan:         Possible atypical infection, knee  -  HI -stable on ART  - s/p TKA  - positive TST?  - AFB stains negative; cultures pending  - CXR normal  - histopath pending  - doubt MTB but await final results - path, cultures  - check IGRA  - please forward any pending results my way

## 2020-08-18 NOTE — PROGRESS NOTES
Carlos Vee presents for initial post-operative visit following a right total knee arthroplasty performed by Dr. Alex on 8/12/2020.  He is in the [facility] program, so this is his one week f/u.     Exam:   Ambulating well with assistive device.  Incision is clean and dry without drainage or erythema.   ROM:0-90    Initial post-operative radiographs reviewed today revealing a well fixed and aligned prosthesis.    A/P:  1 weeks s/p right total knee replacement  Dr. Alex interviewed and examined patient today and agrees with plan.   - The patient was advised to keep the incision clean and dry for the next 24 hours after which he may wash the area with antibacterial soap in the shower. Will not submerge until the incision is completely healed.   - Outpatient PT: will resume at home  - Continue aspirin for 1 month from surgery.  - Reviewed antibiotic prophylaxis  - Pain medication refilled  - Follow up at home as scheduled. Pt will call clinic with problems/concerns.

## 2020-08-19 ENCOUNTER — TELEPHONE (OUTPATIENT)
Dept: ADMINISTRATIVE | Facility: OTHER | Age: 60
End: 2020-08-19

## 2020-08-19 LAB
GAMMA INTERFERON BACKGROUND BLD IA-ACNC: 0.03 IU/ML
M TB IFN-G CD4+ BCKGRND COR BLD-ACNC: 0 IU/ML
MITOGEN IGNF BCKGRD COR BLD-ACNC: 6.98 IU/ML
TB GOLD PLUS: NEGATIVE
TB2 - NIL: 0.01 IU/ML

## 2020-08-20 NOTE — TELEPHONE ENCOUNTER
Late Entry:  0718 - 0746 visited patient in [facility], s/p R TKA, 8-12-20, caregiver at bedside, patient sitting up in bed, surgical leg elevated on pillows, Polar Ice machine in place, reports pain at 7/10 at rest, 10/10 with movement, patient states that injections have worn off, pulse 1+, dressing c/d/i, no s/s of infection, 0 BM x3 since Saturday, encouraged to continue the Colace, reports taking medications as prescribed.       Caregiver, Ms. Madeline Sood, [phone], asked about obtaining Pecocet for her Fibromyalgia, she was unable to keep her appointment in [place] because she received the call to accompany patient and left just before her appointment date.  Writer explained that research about this will be conducted and a call returned to them with next steps.      Encouraged to ice, elevate, and complete home exercises.

## 2020-08-21 LAB
BACTERIA SPEC ANAEROBE CULT: NORMAL
FINAL PATHOLOGIC DIAGNOSIS: NORMAL
GROSS: NORMAL

## 2020-08-24 ENCOUNTER — TELEPHONE (OUTPATIENT)
Dept: ORTHOPEDICS | Facility: CLINIC | Age: 60
End: 2020-08-24

## 2020-08-24 DIAGNOSIS — M79.2 NERVE PAIN: Primary | ICD-10-CM

## 2020-08-24 RX ORDER — GABAPENTIN 300 MG/1
300 CAPSULE ORAL 3 TIMES DAILY
Qty: 90 CAPSULE | Refills: 1 | Status: SHIPPED | OUTPATIENT
Start: 2020-08-24 | End: 2020-08-28

## 2020-08-24 NOTE — TELEPHONE ENCOUNTER
Spoke to pt, he states he has a red rash that is not raised on his shin of his surgical leg, feels like it is on fire. He is concerned that it might be shingles. He also states the bottom of his heal is starting to feel numb. Informed pt will forward to Dr. Alex to advise. Understanding verbalized.

## 2020-08-24 NOTE — TELEPHONE ENCOUNTER
Spoke to pt, informed that Ivis sent Neurontin to his pharmacy and she recommends that he see his PCP. Pt states he will call now to make appt. Pt states now he does not think it is shingles. Informed pt that once I hear back from Dr. Alex will call pt to update as pt has sent another message to Dr. Alex. Understanding verbalized.

## 2020-08-25 ENCOUNTER — TELEPHONE (OUTPATIENT)
Dept: ORTHOPEDICS | Facility: CLINIC | Age: 60
End: 2020-08-25

## 2020-08-25 NOTE — TELEPHONE ENCOUNTER
Spoke to pt to check on symptoms. Pt states he just finished home exercises, he is schedule today for OP PT, he is scheduled to see his PCP tomorrow. He started the Neurontin yesterday. He states the swelling has decreased but his knee and ankle are stiff. He states the red rash is the same and still painful. Reminded pt to elevate when not ambulating and use ace wrap from foot to thigh. Informed pt to call if symptoms worsen. Pt pleased and verbalized understanding.

## 2020-08-26 ENCOUNTER — TELEPHONE (OUTPATIENT)
Dept: ORTHOPEDICS | Facility: CLINIC | Age: 60
End: 2020-08-26

## 2020-08-26 NOTE — TELEPHONE ENCOUNTER
Spoke to pt, informed that I forwarded his message with pictures to Dr. Alex to advise. Pt states he did well with PT yesterday but still complains of knee and ankle stiffness. He states the PT outlined the redness to monitor if it gets larger. Pt seeing PCP today at 1:15pm. Instructed pt to have PCP call if needed. Pt will call after appt to update. Understanding verbalized.

## 2020-08-28 DIAGNOSIS — M79.2 NERVE PAIN: ICD-10-CM

## 2020-08-28 RX ORDER — GABAPENTIN 300 MG/1
600 CAPSULE ORAL 3 TIMES DAILY
Qty: 90 CAPSULE | Refills: 1 | Status: SHIPPED | OUTPATIENT
Start: 2020-08-28 | End: 2020-10-02

## 2020-08-28 NOTE — PROGRESS NOTES
Neurontin increased from 300mg tid to 300mg bid then 600mg at bedtime. He can increase to 600mg tid if needed. New prescription for increased dose sent to pharmacy.

## 2020-08-31 ENCOUNTER — TELEPHONE (OUTPATIENT)
Dept: ORTHOPEDICS | Facility: CLINIC | Age: 60
End: 2020-08-31

## 2020-08-31 NOTE — TELEPHONE ENCOUNTER
Spoke to pt, he states that his Physical therapist recommends an U/S to r/o blood clot due to ankle swelling and calf pain. Spoke with Daniel PT, he states he would rather be overly cautious and have the patient get an U/S to r/o blood clot. Spoke to pt, informed him that I notified Dr. Alex and he is in agreement with ordering the U/S. Instructed pt to call his PCP now and inform that he needs an U/S ordered and scheduled per Dr. Alex and PT recommendation. Pt will call back this afternoon to update after he speaks to the PCP. If order is needed to let us know and we will fax it over. Understanding verbalized.

## 2020-08-31 NOTE — TELEPHONE ENCOUNTER
Spoke to pt, he states he spoke to his PCP office, they ordered the U/S and sent to [facility], he is awaiting a call to get it scheduled for tomorrow. He states that if they are unable to schedule him tomorrow, he will need to go to the Urgent care. Pt will call back to update. Understanding verbalized.

## 2020-09-01 ENCOUNTER — TELEPHONE (OUTPATIENT)
Dept: ORTHOPEDICS | Facility: CLINIC | Age: 60
End: 2020-09-01

## 2020-09-01 NOTE — TELEPHONE ENCOUNTER
Spoke to pt, informed that we received the message from the nurse Poon at his PCP office. Message forwarded to Dr. Alex. Informed pt that Dr. Alex was agreeable to the plan of care as the PCP will be managing the blood clot and hematoma. Informed pt that Dr. Alex will call the patient tomorrow afternoon. Pt pleased and verbalized understanding.

## 2020-09-01 NOTE — TELEPHONE ENCOUNTER
Spoke to pt, he states he is at [facility] and completed the U/S, he states it is positive for blood clot, stating he has a blood clot behind his knee and a hematoma behind his knee. Pt states they are sending the results to his PCP and he is on the way to his PCP. Informed pt will notify Dr. Alex and call him back. Pt verbalized understanding.

## 2020-09-01 NOTE — TELEPHONE ENCOUNTER
Spoke to pt, informed him we received his message. Pt states he is waiting for Selina to call him this morning regarding U/S appt today. Informed pt to call after speaking with Selina.Understanding verbalized.

## 2020-09-02 ENCOUNTER — TELEPHONE (OUTPATIENT)
Dept: ORTHOPEDICS | Facility: CLINIC | Age: 60
End: 2020-09-02

## 2020-09-02 NOTE — TELEPHONE ENCOUNTER
Marisela PT with [facility] called direct line, informing she saw the pt today and is concerned with his ankle mobility and nerve symptoms. She is recommending deep tissue therapy around the ankle (avoiding the popliteal area) specifically mentioned Astym Therapy as she suspects Chronic regional pain syndrome. Informed her that Dr. Alex is calling the patient this afternoon. Marisela PT will fax progress notes and recommendation to our office for Dr. Alex to review. Message forwarded to Dr. Alex to notify. Understanding verbalized.

## 2020-09-03 ENCOUNTER — TELEPHONE (OUTPATIENT)
Dept: ORTHOPEDICS | Facility: CLINIC | Age: 60
End: 2020-09-03

## 2020-09-03 NOTE — TELEPHONE ENCOUNTER
Spoke to Ayah with Dr. Reed office, informed that Dr. Alex would like to speak with the him regarding the pt. Provided Ayah with Dr. Alex's cell number per her request, she states he is not in clinic today but will text him to call Dr. Alex later this afternoon if possible. Understanding verbalized.     Spoke with Marisela PT, she provided cell number to the medical director, [facility]k [facility], stating he is happy to speak with Dr. Alex regarding deep soft tissue technique that they are recommending. e-mail sent to Dr. Alex with cell number. Understanding verbalized.

## 2020-09-04 ENCOUNTER — TELEPHONE (OUTPATIENT)
Dept: ORTHOPEDICS | Facility: CLINIC | Age: 60
End: 2020-09-04

## 2020-09-04 NOTE — TELEPHONE ENCOUNTER
Left message for Marisela PT, no astym therapy while on blood thinners per Dr. Alex. Provided name and number for questions. Faxed Dr. Alex note to [facility].

## 2020-09-15 ENCOUNTER — TELEPHONE (OUTPATIENT)
Dept: ORTHOPEDICS | Facility: CLINIC | Age: 60
End: 2020-09-15

## 2020-09-15 NOTE — TELEPHONE ENCOUNTER
neurontin 600mg q6hr and 1200 at night    Has had 2 injections from pain clinic ?type  Not sure if helping    Continued CRPS symptoms/hypersensitivity    He will find out the exact type of nerve blocks done and let us know    ?start cymbalta

## 2020-09-16 ENCOUNTER — PATIENT MESSAGE (OUTPATIENT)
Dept: ORTHOPEDICS | Facility: CLINIC | Age: 60
End: 2020-09-16

## 2020-09-16 LAB
FUNGUS SPEC CULT: NORMAL

## 2020-09-20 ENCOUNTER — PATIENT MESSAGE (OUTPATIENT)
Dept: ORTHOPEDICS | Facility: CLINIC | Age: 60
End: 2020-09-20

## 2020-09-21 ENCOUNTER — PATIENT MESSAGE (OUTPATIENT)
Dept: ORTHOPEDICS | Facility: CLINIC | Age: 60
End: 2020-09-21

## 2020-09-22 ENCOUNTER — PATIENT MESSAGE (OUTPATIENT)
Dept: ORTHOPEDICS | Facility: CLINIC | Age: 60
End: 2020-09-22

## 2020-09-23 ENCOUNTER — TELEPHONE (OUTPATIENT)
Dept: ORTHOPEDICS | Facility: HOSPITAL | Age: 60
End: 2020-09-23

## 2020-09-23 NOTE — TELEPHONE ENCOUNTER
[facility]  Doctor Yang Alcantar  [phone]    Reviewed patient history of questionable neuropathic pain after injury 10 years ago and HIV neuropathy as well as transient foot drop post op.     Dr Alcantar: has done 2 lumbar sympathetic blocks  One inf L3, and one sup L4  No significant relief.   Therefore at this point we just have time and medications.     Patient is already on gabapentin.   Dr Alcantar recommends amitriptyline and agrees to see patient again to help coordinate and manage neuropathic pain medications.     Called patient, knee moving well, hypersensitivity continues, tolerable with pain meds, discussed plan to get back in with Dr Alcantar to review and coordinate neuropathic meds.

## 2020-10-11 ENCOUNTER — PATIENT MESSAGE (OUTPATIENT)
Dept: ADMINISTRATIVE | Facility: OTHER | Age: 60
End: 2020-10-11

## 2020-10-14 LAB
ACID FAST MOD KINY STN SPEC: NORMAL
MYCOBACTERIUM SPEC QL CULT: NORMAL

## 2020-11-09 ENCOUNTER — PATIENT OUTREACH (OUTPATIENT)
Dept: ORTHOPEDICS | Facility: CLINIC | Age: 60
End: 2020-11-09

## 2020-11-10 ENCOUNTER — PATIENT MESSAGE (OUTPATIENT)
Dept: ADMINISTRATIVE | Facility: OTHER | Age: 60
End: 2020-11-10

## 2020-11-11 ENCOUNTER — PATIENT MESSAGE (OUTPATIENT)
Dept: ORTHOPEDICS | Facility: CLINIC | Age: 60
End: 2020-11-11

## 2020-11-16 ENCOUNTER — TELEPHONE (OUTPATIENT)
Dept: ORTHOPEDICS | Facility: CLINIC | Age: 60
End: 2020-11-16

## 2020-11-16 NOTE — TELEPHONE ENCOUNTER
Spoke to pt, he states he is doing very well and is improving. His OP PT has been extended for 4 more weeks. Nerve pain is better, but states he still has lightening strike sensations sometimes. He would like to extend his leave, provided fax number and informed it is done electronically and will be processed in a timely manner. Pt pleased and verbalized understanding.

## 2021-08-06 ENCOUNTER — PATIENT MESSAGE (OUTPATIENT)
Dept: ORTHOPEDICS | Facility: CLINIC | Age: 61
End: 2021-08-06

## 2023-05-09 NOTE — ASSESSMENT & PLAN NOTE
Work involves standing   Increased risk of thrombosis in the wallace operative period , compression stocking use discussed   regular rate and rhythm , no murmurs, rubs, gallops Yes

## (undated) DEVICE — TOWEL OR XRAY WHITE 17X26IN

## (undated) DEVICE — LAVAGE WOUND BACTISURE 1L BAG

## (undated) DEVICE — UNDERGLOVES BIOGEL PI SIZE 8.5

## (undated) DEVICE — ADHESIVE DERMABOND ADVANCED

## (undated) DEVICE — SOL IRR NACL .9% 3000ML

## (undated) DEVICE — SUT MCRYL PLUS 4-0 PS2 27IN

## (undated) DEVICE — DRESSING AQUACEL AG RBBN 2X45

## (undated) DEVICE — DRESSING TRANS 4X4 TEGADERM

## (undated) DEVICE — BLADE SAGITTAL 18 X 1.27 X 90M

## (undated) DEVICE — PAD KNEE POLAR XL

## (undated) DEVICE — TAPE SILK 3IN

## (undated) DEVICE — NDL 18GA X1 1/2 REG BEVEL

## (undated) DEVICE — SUT 1 36IN COATED VICRYL UN

## (undated) DEVICE — SUT 2/0 36IN COATED VICRYL

## (undated) DEVICE — PUMP COLD THERAPY

## (undated) DEVICE — SYS KNEE EPAK PIN ATTUNE
Type: IMPLANTABLE DEVICE | Site: KNEE | Status: NON-FUNCTIONAL
Removed: 2020-08-12

## (undated) DEVICE — KIT TOTAL KNEE TKOFG

## (undated) DEVICE — BLADE DUAL CUT SAG 35X64X.89MM

## (undated) DEVICE — ELECTRODE REM PLYHSV RETURN 9

## (undated) DEVICE — MARKER SKIN STND TIP BLUE BARR

## (undated) DEVICE — MASK FLYTE HOOD PEEL AWAY

## (undated) DEVICE — SOL BETADINE 5%

## (undated) DEVICE — CATH SUCTION 10FR

## (undated) DEVICE — SYS REVOLUTION CEMENT MIXING

## (undated) DEVICE — GAUZE SPONGE 4X4 12PLY

## (undated) DEVICE — ALCOHOL 70% ISOP W/GREEN 16OZ

## (undated) DEVICE — BLADE RECIP RIBBED

## (undated) DEVICE — CONTAINER SPECIMEN STRL 4OZ

## (undated) DEVICE — KIT IRR SUCTION HND PIECE

## (undated) DEVICE — BRUSH SCRUB HIBICLENS 4%

## (undated) DEVICE — SEE MEDLINE ITEM 146298

## (undated) DEVICE — SPONGE GAUZE 16PLY 4X4

## (undated) DEVICE — GLOVE BIOGEL SKINSENSE PI 8.0

## (undated) DEVICE — DRAPE SURG W/TWL 17 5/8X23

## (undated) DEVICE — SEE MEDLINE ITEM 157144

## (undated) DEVICE — SYR 50CC LL

## (undated) DEVICE — SEE MEDLINE ITEM 157131

## (undated) DEVICE — DRESSING TELFA N ADH 3X8

## (undated) DEVICE — DRAPE INCISE IOBAN 2 23X33IN